# Patient Record
Sex: FEMALE | Race: WHITE | HISPANIC OR LATINO | URBAN - METROPOLITAN AREA
[De-identification: names, ages, dates, MRNs, and addresses within clinical notes are randomized per-mention and may not be internally consistent; named-entity substitution may affect disease eponyms.]

---

## 2021-03-21 ENCOUNTER — APPOINTMENT (OUTPATIENT)
Dept: RADIOLOGY | Facility: IMAGING CENTER | Age: 21
End: 2021-03-21
Attending: PHYSICIAN ASSISTANT
Payer: COMMERCIAL

## 2021-03-21 ENCOUNTER — OFFICE VISIT (OUTPATIENT)
Dept: URGENT CARE | Facility: CLINIC | Age: 21
End: 2021-03-21
Payer: COMMERCIAL

## 2021-03-21 VITALS
TEMPERATURE: 99.3 F | RESPIRATION RATE: 16 BRPM | HEIGHT: 65 IN | WEIGHT: 148.8 LBS | HEART RATE: 92 BPM | BODY MASS INDEX: 24.79 KG/M2 | SYSTOLIC BLOOD PRESSURE: 110 MMHG | DIASTOLIC BLOOD PRESSURE: 60 MMHG | OXYGEN SATURATION: 99 %

## 2021-03-21 DIAGNOSIS — S50.02XA CONTUSION OF LEFT ELBOW, INITIAL ENCOUNTER: ICD-10-CM

## 2021-03-21 DIAGNOSIS — M25.522 LEFT ELBOW PAIN: ICD-10-CM

## 2021-03-21 PROBLEM — K37 APPENDICITIS: Status: ACTIVE | Noted: 2017-05-27

## 2021-03-21 PROCEDURE — 73080 X-RAY EXAM OF ELBOW: CPT | Mod: TC,LT | Performed by: PHYSICIAN ASSISTANT

## 2021-03-21 PROCEDURE — 99203 OFFICE O/P NEW LOW 30 MIN: CPT | Performed by: PHYSICIAN ASSISTANT

## 2021-03-21 RX ORDER — MONTELUKAST SODIUM 10 MG/1
10 TABLET ORAL
COMMUNITY
Start: 2020-12-22 | End: 2023-06-23

## 2021-03-21 RX ORDER — LEVONORGESTREL AND ETHINYL ESTRADIOL 150-30(84)
1 KIT ORAL DAILY
COMMUNITY
Start: 2021-03-07

## 2021-03-21 ASSESSMENT — ENCOUNTER SYMPTOMS
VOMITING: 0
FEVER: 0
CHILLS: 0
NAUSEA: 0
FALLS: 1

## 2021-03-21 NOTE — LETTER
March 21, 2021    To Whom It May Concern:         This is confirmation that Sima Diallo attended her scheduled appointment with Diaz Figueroa P.A.-C. on 3/21/21. Please excuse her from work 3/22-3/24.         If you have any questions please do not hesitate to call me at the phone number listed below.    Sincerely,          Diaz Figueroa P.A.-C.  [phone]

## 2021-03-21 NOTE — PROGRESS NOTES
"Subjective:   Sima Diallo is a 20 y.o. female who presents for Elbow Injury (x3 days, fell while swing dancing, hit left elbow, swollen, bruised, painful. can't bear weight on it.)        Patient is a pleasant 20-year-old female who presents with concerns of elbow, pain, bruising for the last 2 days.  Symptoms began after she fell while swing dancing.  She directly impacted elbow on the ground.  Since then she has had slightly reduced range of motion, pain with ranging, pain with lifting and using the arm.  Rare incidence of tingling in the tips of her fourth and fifth fingers.  She is not currently experiencing this.  She is taking Advil for her symptoms.  This does help, but symptoms return as soon as medication wears off.  Denies prior injury to the elbow.  She is right-hand dominant.    Review of Systems   Constitutional: Negative for chills and fever.   Gastrointestinal: Negative for nausea and vomiting.   Musculoskeletal: Positive for falls and joint pain.       PMH:  has no past medical history on file.  MEDS:   Current Outpatient Medications:   •  montelukast (SINGULAIR) 10 MG Tab, Take 10 mg by mouth every day., Disp: , Rfl:   •  DAYSEE 0.15-0.03 &0.01 MG Tab, Take 1 tablet by mouth every day. TAKE 1 TABLET BY MOUTH DAILY, Disp: , Rfl:   ALLERGIES: Not on File  SURGHX: No past surgical history on file.  SOCHX:  reports that she has never smoked. She has never used smokeless tobacco. She reports current alcohol use. She reports previous drug use.  FH: Family history was reviewed, no pertinent findings to report   Objective:   /60 (BP Location: Left arm, Patient Position: Sitting, BP Cuff Size: Adult)   Pulse 92   Temp 37.4 °C (99.3 °F) (Temporal)   Resp 16   Ht 1.651 m (5' 5\")   Wt 67.5 kg (148 lb 12.8 oz)   SpO2 99%   BMI 24.76 kg/m²   Physical Exam  Vitals reviewed.   Constitutional:       General: She is not in acute distress.     Appearance: Normal appearance. She is well-developed. She is " not toxic-appearing.   HENT:      Head: Normocephalic and atraumatic.      Right Ear: External ear normal.      Left Ear: External ear normal.      Nose: Nose normal.   Eyes:      General: Gaze aligned appropriately.   Cardiovascular:      Rate and Rhythm: Normal rate and regular rhythm.   Pulmonary:      Effort: Pulmonary effort is normal. No respiratory distress.      Breath sounds: No stridor.   Musculoskeletal:      Cervical back: Neck supple.      Comments: Left elbow:  Appearance: Patient has moderate edema and ecchymosis over the posterior aspect of the elbow joint.  Range of motion: Extension to 5 degrees.  Flexion to 110 degrees.  Pronation and supination within normal limits.  Palpation: Very tender to palpation over olecranon.  No tenderness with palpation of the humeral shaft, medial and lateral epicondyles, radial head, radius and ulna shafts.  Neurovascular: Radial pulse +2.  Radial, median, ulnar nerves intact.   Skin:     General: Skin is warm and dry.      Capillary Refill: Capillary refill takes less than 2 seconds.   Neurological:      Mental Status: She is alert and oriented to person, place, and time.      Comments: CN2-12 grossly intact   Psychiatric:         Speech: Speech normal.         Behavior: Behavior normal.     Imaging:   XR: No fracture or dislocation by my read.   Radiology review:    FINDINGS:  Bone mineralization is normal.  There is no evidence of fracture or dislocation.  Soft tissues are normal.     IMPRESSION:     No evidence of fracture or dislocation.      Assessment/Plan:   1. Contusion of left elbow, initial encounter    2. Left elbow pain  - DX-ELBOW-COMPLETE 3+ LEFT; Future    Other orders  - montelukast (SINGULAIR) 10 MG Tab; Take 10 mg by mouth every day.  - DAYSEE 0.15-0.03 &0.01 MG Tab; Take 1 tablet by mouth every day. TAKE 1 TABLET BY MOUTH DAILY    Patient given work note to excuse her from her more physically demanding job.  Slowly increase activity as tolerated.   Avoid activities that could result in reinjury.  Ibuprofen as needed for pain, elevate, ice.  If symptoms fail to improve or fully resolve I would like patient to return to clinic or see PCP for reevaluation.    Differential diagnosis, natural history, supportive care, and indications for immediate follow-up discussed.

## 2023-01-04 ENCOUNTER — HOSPITAL ENCOUNTER (OUTPATIENT)
Facility: MEDICAL CENTER | Age: 23
End: 2023-01-04
Attending: PREVENTIVE MEDICINE
Payer: COMMERCIAL

## 2023-01-04 ENCOUNTER — EMPLOYEE HEALTH (OUTPATIENT)
Dept: OCCUPATIONAL MEDICINE | Facility: CLINIC | Age: 23
End: 2023-01-04
Payer: COMMERCIAL

## 2023-01-04 ENCOUNTER — EH NON-PROVIDER (OUTPATIENT)
Dept: OCCUPATIONAL MEDICINE | Facility: CLINIC | Age: 23
End: 2023-01-04
Payer: COMMERCIAL

## 2023-01-04 DIAGNOSIS — Z02.1 PRE-EMPLOYMENT HEALTH SCREENING EXAMINATION: ICD-10-CM

## 2023-01-04 DIAGNOSIS — Z02.1 PRE-EMPLOYMENT DRUG SCREENING: ICD-10-CM

## 2023-01-04 DIAGNOSIS — Z02.1 PRE-EMPLOYMENT DRUG SCREENING: Primary | ICD-10-CM

## 2023-01-04 LAB
AMP AMPHETAMINE: NORMAL
BAR BARBITURATES: NORMAL
BZO BENZODIAZEPINES: NORMAL
COC COCAINE: NORMAL
INT CON NEG: NORMAL
INT CON POS: NORMAL
MDMA ECSTASY: NORMAL
MET METHAMPHETAMINES: NORMAL
MTD METHADONE: NORMAL
OPI OPIATES: NORMAL
OXY OXYCODONE: NORMAL
PCP PHENCYCLIDINE: NORMAL
POC URINE DRUG SCREEN OCDRS: NORMAL
THC: NORMAL

## 2023-01-04 PROCEDURE — 80305 DRUG TEST PRSMV DIR OPT OBS: CPT | Performed by: PREVENTIVE MEDICINE

## 2023-01-04 PROCEDURE — 86480 TB TEST CELL IMMUN MEASURE: CPT | Performed by: PREVENTIVE MEDICINE

## 2023-01-04 PROCEDURE — 94375 RESPIRATORY FLOW VOLUME LOOP: CPT | Performed by: PREVENTIVE MEDICINE

## 2023-01-04 PROCEDURE — 8915 PR COMPREHENSIVE PHYSICAL: Performed by: NURSE PRACTITIONER

## 2023-01-05 LAB
GAMMA INTERFERON BACKGROUND BLD IA-ACNC: 0.03 IU/ML
M TB IFN-G BLD-IMP: NEGATIVE
M TB IFN-G CD4+ BCKGRND COR BLD-ACNC: -0.01 IU/ML
MITOGEN IGNF BCKGRD COR BLD-ACNC: >10 IU/ML
QFT TB2 - NIL TBQ2: -0.01 IU/ML

## 2023-01-12 ENCOUNTER — EH NON-PROVIDER (OUTPATIENT)
Dept: OCCUPATIONAL MEDICINE | Facility: CLINIC | Age: 23
End: 2023-01-12
Payer: COMMERCIAL

## 2023-01-12 DIAGNOSIS — Z02.83 ENCOUNTER FOR DRUG SCREENING: ICD-10-CM

## 2023-01-12 PROCEDURE — 8911 PR MRO FEE: Performed by: NURSE PRACTITIONER

## 2023-01-18 ENCOUNTER — EH NON-PROVIDER (OUTPATIENT)
Dept: OCCUPATIONAL MEDICINE | Facility: CLINIC | Age: 23
End: 2023-01-18
Payer: COMMERCIAL

## 2023-03-01 ENCOUNTER — OFFICE VISIT (OUTPATIENT)
Dept: URGENT CARE | Facility: CLINIC | Age: 23
End: 2023-03-01
Payer: COMMERCIAL

## 2023-03-01 ENCOUNTER — HOSPITAL ENCOUNTER (OUTPATIENT)
Facility: MEDICAL CENTER | Age: 23
End: 2023-03-01
Attending: NURSE PRACTITIONER
Payer: COMMERCIAL

## 2023-03-01 VITALS
HEIGHT: 64 IN | DIASTOLIC BLOOD PRESSURE: 60 MMHG | HEART RATE: 98 BPM | BODY MASS INDEX: 23.39 KG/M2 | RESPIRATION RATE: 14 BRPM | SYSTOLIC BLOOD PRESSURE: 108 MMHG | WEIGHT: 137 LBS | OXYGEN SATURATION: 99 % | TEMPERATURE: 98.8 F

## 2023-03-01 DIAGNOSIS — R35.0 URINARY FREQUENCY: ICD-10-CM

## 2023-03-01 DIAGNOSIS — R10.9 FLANK PAIN: ICD-10-CM

## 2023-03-01 LAB
APPEARANCE UR: CLEAR
BILIRUB UR STRIP-MCNC: NEGATIVE MG/DL
COLOR UR AUTO: YELLOW
GLUCOSE UR STRIP.AUTO-MCNC: NEGATIVE MG/DL
KETONES UR STRIP.AUTO-MCNC: NEGATIVE MG/DL
LEUKOCYTE ESTERASE UR QL STRIP.AUTO: NEGATIVE
NITRITE UR QL STRIP.AUTO: NEGATIVE
PH UR STRIP.AUTO: 5.5 [PH] (ref 5–8)
POCT INT CON NEG: NEGATIVE
POCT INT CON POS: POSITIVE
POCT URINE PREGNANCY TEST: NEGATIVE
PROT UR QL STRIP: NEGATIVE MG/DL
RBC UR QL AUTO: NORMAL
SP GR UR STRIP.AUTO: 1.01
UROBILINOGEN UR STRIP-MCNC: 0.2 MG/DL

## 2023-03-01 PROCEDURE — 81002 URINALYSIS NONAUTO W/O SCOPE: CPT | Performed by: NURSE PRACTITIONER

## 2023-03-01 PROCEDURE — 99213 OFFICE O/P EST LOW 20 MIN: CPT | Performed by: NURSE PRACTITIONER

## 2023-03-01 PROCEDURE — 87077 CULTURE AEROBIC IDENTIFY: CPT

## 2023-03-01 PROCEDURE — 87086 URINE CULTURE/COLONY COUNT: CPT

## 2023-03-01 PROCEDURE — 81025 URINE PREGNANCY TEST: CPT | Performed by: NURSE PRACTITIONER

## 2023-03-01 PROCEDURE — 87186 SC STD MICRODIL/AGAR DIL: CPT

## 2023-03-01 RX ORDER — IBUPROFEN 600 MG/1
600 TABLET ORAL EVERY 8 HOURS PRN
Qty: 12 TABLET | Refills: 0 | Status: SHIPPED | OUTPATIENT
Start: 2023-03-01 | End: 2023-03-05

## 2023-03-01 RX ORDER — TAMSULOSIN HYDROCHLORIDE 0.4 MG/1
0.4 CAPSULE ORAL
Qty: 30 CAPSULE | Refills: 0 | Status: SHIPPED | OUTPATIENT
Start: 2023-03-01 | End: 2023-03-31

## 2023-03-01 RX ORDER — KETOROLAC TROMETHAMINE 30 MG/ML
30 INJECTION, SOLUTION INTRAMUSCULAR; INTRAVENOUS ONCE
Status: COMPLETED | OUTPATIENT
Start: 2023-03-01 | End: 2023-03-01

## 2023-03-01 RX ORDER — ONDANSETRON 4 MG/1
4 TABLET, ORALLY DISINTEGRATING ORAL EVERY 6 HOURS PRN
Qty: 15 TABLET | Refills: 0 | Status: SHIPPED | OUTPATIENT
Start: 2023-03-01

## 2023-03-01 RX ADMIN — KETOROLAC TROMETHAMINE 30 MG: 30 INJECTION, SOLUTION INTRAMUSCULAR; INTRAVENOUS at 13:05

## 2023-03-01 NOTE — PROGRESS NOTES
Patient has consented to treatment and for use of patient information for treatment and billing purposes.    Date: 03/01/23     Arrival Mode: Private Vehicle    Chief Complaint:    Chief Complaint   Patient presents with    Emesis     Monday, sharp back pain on RT side, history of kidney stones, vomiting, nausea.        History of Present Illness: 22 y.o.  female presents to clinic with 1 day 3 of intermittent right-sided flank pain that is sharp in nature and colic like.  Patient states the pain was severe on Monday night which was 3 nights ago.  She states when the pain does return she has some nausea and vomiting.  Patient does admit to urinary frequency denies urgency or burning with urination.  Patient has had no recent fevers and or body aches.  She is able to keep food down as well as fluid.  Patient states she has had a kidney stone prior that she was able to pass on her own confirmed with a CT scan.  She denies any shortness of breath chest pain or leg swelling.  Denies possibility of STI STD or pregnancy.      ROS:    As stated in HPI     Pertinent Medical History:  History reviewed. No pertinent past medical history.     Pertinent Surgical History:  History reviewed. No pertinent surgical history.     Pertinent Medications:    Current Outpatient Medications on File Prior to Visit   Medication Sig Dispense Refill    DAYSEE 0.15-0.03 &0.01 MG Tab Take 1 tablet by mouth every day. TAKE 1 TABLET BY MOUTH DAILY      montelukast (SINGULAIR) 10 MG Tab Take 10 mg by mouth every day.       No current facility-administered medications on file prior to visit.        Allergies:    Patient has no known allergies.     Social History:  Social History     Tobacco Use    Smoking status: Never    Smokeless tobacco: Never   Vaping Use    Vaping Use: Never used   Substance Use Topics    Alcohol use: Yes    Drug use: Not Currently        Patient's last menstrual period was 02/19/2023 (approximate).           Physical  Exam:    Vitals:    03/01/23 1238   BP: 108/60   Pulse: 98   Resp: 14   Temp: 37.1 °C (98.8 °F)   SpO2: 99%             Physical Exam  Constitutional:       General: She is not in acute distress.     Appearance: Normal appearance. She is well-developed. She is not ill-appearing or toxic-appearing.   HENT:      Head: Normocephalic and atraumatic.   Cardiovascular:      Rate and Rhythm: Normal rate and regular rhythm.      Heart sounds: Normal heart sounds.   Pulmonary:      Effort: Pulmonary effort is normal. No respiratory distress.      Breath sounds: Normal breath sounds. No wheezing.   Abdominal:      General: Abdomen is flat. Bowel sounds are normal.      Palpations: Abdomen is soft.      Tenderness: There is no abdominal tenderness. There is right CVA tenderness. There is no left CVA tenderness, guarding or rebound.   Skin:     General: Skin is warm.      Capillary Refill: Capillary refill takes less than 2 seconds.      Coloration: Skin is not cyanotic or pale.   Neurological:      Mental Status: She is alert and oriented to person, place, and time.      Gait: Gait is intact.   Psychiatric:         Behavior: Behavior normal. Behavior is cooperative.        Diagnostics:    Recent Results (from the past 24 hour(s))   POCT Urinalysis    Collection Time: 03/01/23 12:52 PM   Result Value Ref Range    POC Color Yellow Negative    POC Appearance Clear Negative    POC Glucose Negative Negative mg/dL    POC Bilirubin Negative Negative mg/dL    POC Ketones Negative Negative mg/dL    POC Specific Gravity 1.010 <1.005 - >1.030    POC Blood Trace-intact Negative    POC Urine PH 5.5 5.0 - 8.0    POC Protein Negative Negative mg/dL    POC Urobiligen 0.2 Negative (0.2) mg/dL    POC Nitrites Negative Negative    POC Leukocyte Esterase Negative Negative   POCT PREGNANCY    Collection Time: 03/01/23 12:52 PM   Result Value Ref Range    POC Urine Pregnancy Test Negative     Internal Control Positive Positive     Internal Control  Negative Negative           Urine culture pending    Medical Decision making and clinic course :  I personally reviewed prior external notes and test results pertinent to today's visit. Pt is clinically stable at today's acute urgent care visit.  No acute distress noted. Appropriate for outpatient care at this time. Shared decision-making was utilized with patient for treatment plan.    Pleasant nontoxic-appearing 22-year-old female presenting to clinic with HPI and exam findings consistent with right nephrolithiasis.  Discussed with patient obtaining a CT scan for renal colic using shared decision making patient would like to defer CT scan at this time.  Patient was given in clinic Toradol after approximately 15 minutes patient did have some relief in pain.  Will send for Flomax advised patient to take at night as it can decrease her blood pressure.  Did also send for Zofran for nausea.  Did discuss strict go to ER guidelines and the need for further imaging.  Patient did verbalize understanding and agree with plan.    The patient remained stable during the urgent care visit.    Plan:    Medication discussed included indication for use and the potential  benefits and side effects.    Administrations This Visit       ketorolac (TORADOL) injection 30 mg       Admin Date  03/01/2023 Action  Given Dose  30 mg Route  Intramuscular Administered By  [facility]                     1. Flank pain    - POCT Urinalysis  - ketorolac (TORADOL) injection 30 mg  - tamsulosin (FLOMAX) 0.4 MG capsule; Take 1 Capsule by mouth 1/2 hour after breakfast for 30 days.  Dispense: 30 Capsule; Refill: 0  - ondansetron (ZOFRAN ODT) 4 MG TABLET DISPERSIBLE; Take 1 Tablet by mouth every 6 hours as needed for Nausea/Vomiting for up to 15 doses.  Dispense: 15 Tablet; Refill: 0  - ibuprofen (MOTRIN) 600 MG Tab; Take 1 Tablet by mouth every 8 hours as needed for Moderate Pain for up to 4 days.  Dispense: 12 Tablet; Refill: 0    2.  Urinary frequency    - URINE CULTURE(NEW); Future  - POCT PREGNANCY      All of the patient's questions were answered to their satisfaction at the time of discharge.    Follow up:    Recommended f/u in  24 hours  if there is no improvement.    Patient was encouraged to monitor symptoms closely. Those signs and symptoms which would warrant concern and mandate seeking a higher level of service through the emergency department discussed at length and included in discharge papers.  Patient stated agreement and understanding of this plan of care.    Disposition:  Home in stable condition       Voice Recognition Disclaimer:  Portions of this document were created using voice recognition software. The software does have a chance of producing errors of grammar and possibly content. I have made every reasonable attempt to correct obvious errors, but there may be errors of grammar and possibly content that I did not discover before finalizing the documentation.    Janine Zhang, [email or web address].

## 2023-03-04 ENCOUNTER — TELEPHONE (OUTPATIENT)
Dept: URGENT CARE | Facility: PHYSICIAN GROUP | Age: 23
End: 2023-03-04
Payer: COMMERCIAL

## 2023-03-04 DIAGNOSIS — N30.00 ACUTE CYSTITIS WITHOUT HEMATURIA: ICD-10-CM

## 2023-03-04 LAB
BACTERIA UR CULT: ABNORMAL
BACTERIA UR CULT: ABNORMAL
SIGNIFICANT IND 70042: ABNORMAL
SITE SITE: ABNORMAL
SOURCE SOURCE: ABNORMAL

## 2023-03-04 RX ORDER — CEFDINIR 300 MG/1
300 CAPSULE ORAL 2 TIMES DAILY
Qty: 20 CAPSULE | Refills: 0 | Status: SHIPPED | OUTPATIENT
Start: 2023-03-04 | End: 2023-03-14

## 2023-03-04 NOTE — TELEPHONE ENCOUNTER
Called and discussed results with pt. Due to previous flank pain will tx for 10 days.     1. Acute cystitis without hematuria    - cefdinir (OMNICEF) 300 MG Cap; Take 1 Capsule by mouth 2 times a day for 10 days.  Dispense: 20 Capsule; Refill: 0

## 2023-06-23 ENCOUNTER — OFFICE VISIT (OUTPATIENT)
Dept: URGENT CARE | Facility: CLINIC | Age: 23
End: 2023-06-23
Payer: COMMERCIAL

## 2023-06-23 VITALS
WEIGHT: 135 LBS | DIASTOLIC BLOOD PRESSURE: 60 MMHG | BODY MASS INDEX: 23.05 KG/M2 | HEART RATE: 88 BPM | OXYGEN SATURATION: 98 % | SYSTOLIC BLOOD PRESSURE: 114 MMHG | RESPIRATION RATE: 16 BRPM | HEIGHT: 64 IN | TEMPERATURE: 98.2 F

## 2023-06-23 DIAGNOSIS — J01.40 ACUTE NON-RECURRENT PANSINUSITIS: ICD-10-CM

## 2023-06-23 PROCEDURE — 3078F DIAST BP <80 MM HG: CPT | Performed by: REGISTERED NURSE

## 2023-06-23 PROCEDURE — 3074F SYST BP LT 130 MM HG: CPT | Performed by: REGISTERED NURSE

## 2023-06-23 PROCEDURE — 99213 OFFICE O/P EST LOW 20 MIN: CPT | Performed by: REGISTERED NURSE

## 2023-06-23 RX ORDER — AMOXICILLIN AND CLAVULANATE POTASSIUM 875; 125 MG/1; MG/1
1 TABLET, FILM COATED ORAL 2 TIMES DAILY
Qty: 14 TABLET | Refills: 0 | Status: SHIPPED | OUTPATIENT
Start: 2023-06-23 | End: 2023-06-30

## 2023-06-23 ASSESSMENT — ENCOUNTER SYMPTOMS
DIZZINESS: 0
NECK PAIN: 0
CHILLS: 1
FEVER: 0
SINUS PAIN: 1
HEADACHES: 0
SHORTNESS OF BREATH: 0

## 2023-06-23 NOTE — PROGRESS NOTES
"Subjective:   Sima Diallo is a 22 y.o. female who presents for Sore Throat (X 1 day, ear pain, sore throat, following a cold. )      HPI  2-3 weeks of nasal congestion, nasal drainage that is thick and yellow, sore throat, cough that has since improved, occasional sore throat. Symptoms started to improve and then she had a resurgence of sinus focused symptoms. No ear or sinus surgeries. No recent antibiotic. No underlying medical issues, immunizations up to date. Denies pregnancy.     Review of Systems   Constitutional:  Positive for chills. Negative for fever.   HENT:  Positive for ear pain and sinus pain.    Respiratory:  Negative for shortness of breath.    Cardiovascular:  Negative for chest pain.   Musculoskeletal:  Negative for neck pain.   Skin:  Negative for rash.   Neurological:  Negative for dizziness and headaches.       Medications, Allergies, and current problem list reviewed today in Epic.     Objective:     /60   Pulse 88   Temp 36.8 °C (98.2 °F) (Temporal)   Resp 16   Ht 1.626 m (5' 4\")   Wt 61.2 kg (135 lb)   SpO2 98%     Physical Exam  Vitals and nursing note reviewed.   Constitutional:       General: She is not in acute distress.     Appearance: Normal appearance. She is well-developed. She is not ill-appearing, toxic-appearing or diaphoretic.   HENT:      Head: Normocephalic and atraumatic.      Right Ear: Hearing, tympanic membrane, ear canal and external ear normal. No decreased hearing noted. Tympanic membrane is not erythematous.      Left Ear: Hearing, tympanic membrane, ear canal and external ear normal. No decreased hearing noted. Tympanic membrane is not erythematous.      Nose: Mucosal edema, congestion and rhinorrhea present. Rhinorrhea is purulent.      Right Turbinates: Swollen.      Left Turbinates: Swollen.      Right Sinus: Maxillary sinus tenderness present.      Left Sinus: Maxillary sinus tenderness present.      Mouth/Throat:      Mouth: Mucous membranes are " moist.      Dentition: Normal dentition.      Pharynx: Posterior oropharyngeal erythema present. No oropharyngeal exudate.   Eyes:      General: No scleral icterus.        Right eye: No discharge.         Left eye: No discharge.      Conjunctiva/sclera: Conjunctivae normal.   Cardiovascular:      Rate and Rhythm: Normal rate and regular rhythm.      Pulses: Normal pulses.      Heart sounds: Normal heart sounds. No murmur heard.  Pulmonary:      Effort: Pulmonary effort is normal. No respiratory distress.      Breath sounds: Normal breath sounds. No wheezing, rhonchi or rales.   Musculoskeletal:      Cervical back: Normal range of motion and neck supple.   Lymphadenopathy:      Cervical: No cervical adenopathy.   Skin:     General: Skin is warm and dry.      Nails: There is no clubbing.   Neurological:      General: No focal deficit present.      Mental Status: She is alert and oriented to person, place, and time. Mental status is at baseline.   Psychiatric:         Mood and Affect: Mood normal.       Assessment/Plan:     Diagnosis and associated orders:     1. Acute non-recurrent pansinusitis  amoxicillin-clavulanate (AUGMENTIN) 875-125 MG Tab           Comments/MDM:     Vital signs WNL, non toxic appearance, no red flag signs or symptoms  2-3 weeks of symptoms, there was a period of improvement followed by resurgence of sinus focus symptoms  Frontal and maxillary sinus tenderness, swollen turbinates, posterior OP erythemic, thick yellow drainage  Will start on antibiotic for bacterial sinusitis  Continue with OTC cold and sinus medications, sinus rinses, warm steam showers, adequate hydration  Follow up with primary care provider          Differential diagnosis, natural history, supportive care, and indications for immediate follow-up discussed.    Return to clinic or go to ED if symptoms worsen or persist. Indications for ED discussed at length. Patient/Parent/Guardian voices understanding. Follow-up with your  primary care provider in 3-5 days. Red flag symptoms discussed. All side effects of medication discussed including allergic response, GI upset, tendon injury, rash, sedation etc.    I personally reviewed prior external notes and test results pertinent to today's visit as well as additional imaging and testing completed in clinic today.     Please note that this dictation was created using voice recognition software. I have made every reasonable attempt to correct obvious errors, but I expect that there are errors of grammar and possibly content that I did not discover before finalizing the note.    This note was electronically signed by OSCAR Flores

## 2023-06-28 ENCOUNTER — OFFICE VISIT (OUTPATIENT)
Dept: URGENT CARE | Facility: CLINIC | Age: 23
End: 2023-06-28
Payer: COMMERCIAL

## 2023-06-28 VITALS
HEIGHT: 64 IN | BODY MASS INDEX: 23.39 KG/M2 | DIASTOLIC BLOOD PRESSURE: 70 MMHG | OXYGEN SATURATION: 99 % | WEIGHT: 137 LBS | TEMPERATURE: 98.2 F | HEART RATE: 94 BPM | RESPIRATION RATE: 16 BRPM | SYSTOLIC BLOOD PRESSURE: 120 MMHG

## 2023-06-28 DIAGNOSIS — J01.00 ACUTE MAXILLARY SINUSITIS, RECURRENCE NOT SPECIFIED: ICD-10-CM

## 2023-06-28 DIAGNOSIS — H65.91 MIDDLE EAR EFFUSION, RIGHT: ICD-10-CM

## 2023-06-28 PROCEDURE — 3078F DIAST BP <80 MM HG: CPT | Performed by: PHYSICIAN ASSISTANT

## 2023-06-28 PROCEDURE — 3074F SYST BP LT 130 MM HG: CPT | Performed by: PHYSICIAN ASSISTANT

## 2023-06-28 PROCEDURE — 99213 OFFICE O/P EST LOW 20 MIN: CPT | Performed by: PHYSICIAN ASSISTANT

## 2023-06-28 RX ORDER — PREDNISONE 10 MG/1
TABLET ORAL
Qty: 15 TABLET | Refills: 0 | Status: SHIPPED | OUTPATIENT
Start: 2023-06-28

## 2023-06-28 ASSESSMENT — ENCOUNTER SYMPTOMS
FEVER: 0
FOCAL WEAKNESS: 0
SHORTNESS OF BREATH: 0
HEADACHES: 1
VOMITING: 0
SPEECH CHANGE: 0
SENSORY CHANGE: 0
SORE THROAT: 0
COUGH: 0
NAUSEA: 1
STRIDOR: 0
DIZZINESS: 0
WHEEZING: 0
DIARRHEA: 0
CHILLS: 0
ABDOMINAL PAIN: 0
WEAKNESS: 0
SINUS PAIN: 1
TINGLING: 0

## 2023-06-28 NOTE — PROGRESS NOTES
"Subjective     Sima Diallo is a 22 y.o. female who presents with Sinusitis (Was prescribed antibiotics and is on 5 days, still having facial pressure and right ear feels clogged and pressure. Also feeling nausea from the pain.)            Patient was seen 6 days ago and is on day 5 of AUgmentin for a sinus infection. She reports her drainage and overall feeling of wellness has improved but she continues to have intense right facial pressure, right ear fullness and decreased hearing. She denies fever or chills. She has moderate sinus pressure. She states she is nauseous because of the pressure. No respiratory symptoms.      No past medical history on file.      No past surgical history on file.      No family history on file.    Allergies   Allergen Reactions    Cefdinir Rash     Rash.       Cefdinir      Medications, Allergies, and current problem list reviewed today in Epic    Review of Systems   Constitutional:  Negative for chills, fever and malaise/fatigue.   HENT:  Positive for congestion, ear pain, hearing loss and sinus pain. Negative for sore throat.    Respiratory:  Negative for cough, shortness of breath, wheezing and stridor.    Cardiovascular:  Negative for chest pain and leg swelling.   Gastrointestinal:  Positive for nausea. Negative for abdominal pain, diarrhea and vomiting.   Neurological:  Positive for headaches. Negative for dizziness, tingling, sensory change, speech change, focal weakness and weakness.        All other systems reviewed and are negative. '        Objective     /70 (BP Location: Left arm, Patient Position: Sitting, BP Cuff Size: Adult)   Pulse 94   Temp 36.8 °C (98.2 °F) (Temporal)   Resp 16   Ht 1.626 m (5' 4\")   Wt 62.1 kg (137 lb)   SpO2 99%   BMI 23.52 kg/m²      Physical Exam  Constitutional:       General: She is not in acute distress.     Appearance: She is not ill-appearing.   HENT:      Head: Normocephalic and atraumatic.      Right Ear: Ear canal and " external ear normal. A middle ear effusion is present. Tympanic membrane is injected.      Left Ear: Tympanic membrane, ear canal and external ear normal.      Nose: Congestion and rhinorrhea present.      Right Sinus: Maxillary sinus tenderness and frontal sinus tenderness present.      Left Sinus: No maxillary sinus tenderness or frontal sinus tenderness.      Mouth/Throat:      Mouth: Mucous membranes are moist.      Pharynx: No posterior oropharyngeal erythema.   Eyes:      Conjunctiva/sclera: Conjunctivae normal.   Cardiovascular:      Rate and Rhythm: Normal rate and regular rhythm.      Heart sounds: Normal heart sounds.   Pulmonary:      Effort: Pulmonary effort is normal. No respiratory distress.      Breath sounds: Normal breath sounds. No wheezing, rhonchi or rales.   Skin:     General: Skin is warm and dry.   Neurological:      General: No focal deficit present.      Mental Status: She is alert and oriented to person, place, and time.   Psychiatric:         Mood and Affect: Mood normal.         Behavior: Behavior normal.         Thought Content: Thought content normal.         Judgment: Judgment normal.                             Assessment & Plan        1. Acute maxillary sinusitis, recurrence not specified    2. Middle ear effusion, right    - predniSONE (DELTASONE) 10 MG Tab; 3 tabs po daily 5 days.  Dispense: 15 Tablet; Refill: 0  Finish antibiotics  Continue Saline rinses and Sudafed.    Differential diagnoses, Supportive care, and indications for immediate follow-up discussed with patient.   Pathogenesis of diagnosis discussed including typical length and natural progression.   Instructed to return to clinic or nearest emergency department for any change in condition, further concerns, or worsening of symptoms.        The patient demonstrated a good understanding and agreed with the treatment plan.      Janine Moses P.A.-C.

## 2023-10-02 ENCOUNTER — IMMUNIZATION (OUTPATIENT)
Dept: OCCUPATIONAL MEDICINE | Facility: CLINIC | Age: 23
End: 2023-10-02

## 2023-10-02 DIAGNOSIS — Z23 NEED FOR VACCINATION: Primary | ICD-10-CM

## 2023-10-02 PROCEDURE — 90686 IIV4 VACC NO PRSV 0.5 ML IM: CPT | Performed by: PREVENTIVE MEDICINE

## 2024-04-02 ENCOUNTER — OFFICE VISIT (OUTPATIENT)
Dept: URGENT CARE | Facility: PHYSICIAN GROUP | Age: 24
End: 2024-04-02
Payer: COMMERCIAL

## 2024-04-02 VITALS
OXYGEN SATURATION: 99 % | BODY MASS INDEX: 24.75 KG/M2 | DIASTOLIC BLOOD PRESSURE: 60 MMHG | HEART RATE: 108 BPM | WEIGHT: 145 LBS | SYSTOLIC BLOOD PRESSURE: 100 MMHG | RESPIRATION RATE: 18 BRPM | HEIGHT: 64 IN | TEMPERATURE: 98 F

## 2024-04-02 DIAGNOSIS — R10.9 ACUTE RIGHT FLANK PAIN: ICD-10-CM

## 2024-04-02 DIAGNOSIS — R35.0 URINARY FREQUENCY: ICD-10-CM

## 2024-04-02 DIAGNOSIS — N30.01 ACUTE CYSTITIS WITH HEMATURIA: Primary | ICD-10-CM

## 2024-04-02 LAB
APPEARANCE UR: NORMAL
BILIRUB UR STRIP-MCNC: NEGATIVE MG/DL
COLOR UR AUTO: YELLOW
GLUCOSE UR STRIP.AUTO-MCNC: NEGATIVE MG/DL
KETONES UR STRIP.AUTO-MCNC: NEGATIVE MG/DL
LEUKOCYTE ESTERASE UR QL STRIP.AUTO: NORMAL
NITRITE UR QL STRIP.AUTO: NEGATIVE
PH UR STRIP.AUTO: 5.5 [PH] (ref 5–8)
POCT INT CON NEG: NEGATIVE
POCT INT CON POS: POSITIVE
POCT URINE PREGNANCY TEST: NEGATIVE
PROT UR QL STRIP: NEGATIVE MG/DL
RBC UR QL AUTO: NORMAL
SP GR UR STRIP.AUTO: 1.02
UROBILINOGEN UR STRIP-MCNC: 0.2 MG/DL

## 2024-04-02 PROCEDURE — 81025 URINE PREGNANCY TEST: CPT | Performed by: PHYSICIAN ASSISTANT

## 2024-04-02 PROCEDURE — 99213 OFFICE O/P EST LOW 20 MIN: CPT | Performed by: PHYSICIAN ASSISTANT

## 2024-04-02 PROCEDURE — 81002 URINALYSIS NONAUTO W/O SCOPE: CPT | Performed by: PHYSICIAN ASSISTANT

## 2024-04-02 PROCEDURE — 3074F SYST BP LT 130 MM HG: CPT | Performed by: PHYSICIAN ASSISTANT

## 2024-04-02 PROCEDURE — 3078F DIAST BP <80 MM HG: CPT | Performed by: PHYSICIAN ASSISTANT

## 2024-04-02 RX ORDER — SULFAMETHOXAZOLE AND TRIMETHOPRIM 800; 160 MG/1; MG/1
1 TABLET ORAL 2 TIMES DAILY
Qty: 14 TABLET | Refills: 0 | Status: SHIPPED | OUTPATIENT
Start: 2024-04-02 | End: 2024-04-09

## 2024-04-02 ASSESSMENT — ENCOUNTER SYMPTOMS
FLANK PAIN: 1
FEVER: 0
ANOREXIA: 0
VOMITING: 0
CHILLS: 0
MYALGIAS: 1
CHANGE IN BOWEL HABIT: 0

## 2024-04-02 NOTE — PROGRESS NOTES
"Subjective     Sima Diallo is a 23 y.o. female who presents with UTI (Flank pain, frequent urination, )            Patient presents with:  UTI: Flank pain, frequent urination for the past few days.  Pt has had UTI in past with similar symptom onset with neg UA but positive urine culture. 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  Patient denies fever, chills, nausea vomiting or diarrhea.  No other complaints.  Patient has not taken any over-the-counter medications for her symptoms.        UTI  This is a new problem. The current episode started in the past 7 days. The problem occurs constantly. The problem has been gradually worsening. Associated symptoms include myalgias and urinary symptoms. Pertinent negatives include no anorexia, change in bowel habit, chills, fever or vomiting. She has tried drinking and NSAIDs for the symptoms. The treatment provided no relief.       Review of Systems   Constitutional:  Negative for chills and fever.   Gastrointestinal:  Negative for anorexia, change in bowel habit and vomiting.   Genitourinary:  Positive for flank pain (right).   Musculoskeletal:  Positive for myalgias.   All other systems reviewed and are negative.             Objective     /60 (BP Location: Left arm, Patient Position: Sitting, BP Cuff Size: Adult)   Pulse (!) 108   Temp 36.7 °C (98 °F) (Temporal)   Resp 18   Ht 1.626 m (5' 4\")   Wt 65.8 kg (145 lb)   SpO2 99%   BMI 24.89 kg/m²      Physical Exam  Vitals and nursing note reviewed.   Constitutional:       General: She is not in acute distress.     Appearance: Normal appearance. She is well-developed. She is not toxic-appearing.   HENT:      Head: Normocephalic and atraumatic.      Nose: Nose normal.      Mouth/Throat:      Mouth: Mucous membranes are moist.   Eyes:      Extraocular Movements: Extraocular movements intact.      Conjunctiva/sclera: Conjunctivae normal.      Pupils: " Pupils are equal, round, and reactive to light.   Cardiovascular:      Rate and Rhythm: Normal rate and regular rhythm.      Pulses: Normal pulses.      Heart sounds: Normal heart sounds.   Pulmonary:      Effort: Pulmonary effort is normal.      Breath sounds: Normal breath sounds.   Abdominal:      General: Bowel sounds are normal.      Palpations: Abdomen is soft.      Tenderness: There is right CVA tenderness. There is no guarding or rebound.   Musculoskeletal:         General: Normal range of motion.      Cervical back: Normal range of motion and neck supple.   Skin:     General: Skin is warm and dry.      Capillary Refill: Capillary refill takes less than 2 seconds.   Neurological:      General: No focal deficit present.      Mental Status: She is alert and oriented to person, place, and time.      Gait: Gait normal.   Psychiatric:         Mood and Affect: Mood normal.         Behavior: Behavior is cooperative.                             Assessment & Plan        1. Acute cystitis with hematuria     - sulfamethoxazole-trimethoprim (BACTRIM DS) 800-160 MG tablet; Take 1 Tablet by mouth 2 times a day for 7 days.  Dispense: 14 Tablet; Refill: 0  - POCT Urinalysis  - POCT Pregnancy    2. Acute right flank pain     - sulfamethoxazole-trimethoprim (BACTRIM DS) 800-160 MG tablet; Take 1 Tablet by mouth 2 times a day for 7 days.  Dispense: 14 Tablet; Refill: 0  - POCT Urinalysis  - POCT Pregnancy    3. Urinary frequency     - sulfamethoxazole-trimethoprim (BACTRIM DS) 800-160 MG tablet; Take 1 Tablet by mouth 2 times a day for 7 days.  Dispense: 14 Tablet; Refill: 0  - POCT Urinalysis  - POCT Pregnancy          UA: Cloudy, positive leuk esterase, positive blood.  Pregnancy: Negative    Patient HPI, physical exam and positive UA consistent with UTI.  I reviewed patient's most recent urine culture which was pansensitive.  I will treat with Bactrim DS twice daily x 7 days due to patient's flank pain I feel nitrofurantoin  is not the best choice as Bactrim has better sensitivity.    PT can take over the counter NSAIDS (ibuprofen, naproxen) as needed for relief of pain, fever and swelling.  These can be taken for symptoms every (8,12) hours.        Differential diagnosis, supportive care, and indications for immediate follow-up discussed with patient.  Instructed to return to clinic or nearest emergency department for any change in condition, further concerns, or worsening of symptoms.    I personally reviewed prior external notes and test results pertinent to today's visit.  I have independently reviewed and interpreted all diagnostics ordered during this urgent care visit.    PT should follow up with PCP in 1-2 days for re-evaluation if symptoms have not improved.      Discussed red flags and reasons to return to UC or ED.      Pt and/or family verbalized understanding of diagnosis and follow up instructions and was offered informational handout on diagnosis.  PT discharged.     Please note that this dictation was created using voice recognition software. I have made every reasonable attempt to correct obvious errors, but I expect that there may be errors of grammar and possibly content that I did not discover before finalizing the note.

## 2025-02-21 ENCOUNTER — OFFICE VISIT (OUTPATIENT)
Dept: MEDICAL GROUP | Facility: IMAGING CENTER | Age: 25
End: 2025-02-21
Payer: COMMERCIAL

## 2025-02-21 ENCOUNTER — RESULTS FOLLOW-UP (OUTPATIENT)
Dept: MEDICAL GROUP | Facility: IMAGING CENTER | Age: 25
End: 2025-02-21

## 2025-02-21 VITALS
HEART RATE: 102 BPM | SYSTOLIC BLOOD PRESSURE: 104 MMHG | TEMPERATURE: 97.9 F | HEIGHT: 64 IN | BODY MASS INDEX: 26.26 KG/M2 | OXYGEN SATURATION: 98 % | DIASTOLIC BLOOD PRESSURE: 56 MMHG | RESPIRATION RATE: 14 BRPM | WEIGHT: 153.8 LBS

## 2025-02-21 DIAGNOSIS — Z13.21 ENCOUNTER FOR VITAMIN DEFICIENCY SCREENING: ICD-10-CM

## 2025-02-21 DIAGNOSIS — Z00.00 HEALTHCARE MAINTENANCE: ICD-10-CM

## 2025-02-21 DIAGNOSIS — Z11.4 SCREENING FOR HIV (HUMAN IMMUNODEFICIENCY VIRUS): ICD-10-CM

## 2025-02-21 DIAGNOSIS — R42 DIZZINESS: ICD-10-CM

## 2025-02-21 DIAGNOSIS — Z11.59 NEED FOR HEPATITIS C SCREENING TEST: ICD-10-CM

## 2025-02-21 PROBLEM — K37 APPENDICITIS: Status: RESOLVED | Noted: 2017-05-27 | Resolved: 2025-02-21

## 2025-02-21 LAB
GLUCOSE BLD-MCNC: 103 MG/DL (ref 65–99)
HBA1C MFR BLD: 5.1 % (ref ?–5.8)
POCT INT CON NEG: NEGATIVE
POCT INT CON POS: POSITIVE

## 2025-02-21 PROCEDURE — 3074F SYST BP LT 130 MM HG: CPT | Performed by: STUDENT IN AN ORGANIZED HEALTH CARE EDUCATION/TRAINING PROGRAM

## 2025-02-21 PROCEDURE — 99214 OFFICE O/P EST MOD 30 MIN: CPT | Mod: 25 | Performed by: STUDENT IN AN ORGANIZED HEALTH CARE EDUCATION/TRAINING PROGRAM

## 2025-02-21 PROCEDURE — 3078F DIAST BP <80 MM HG: CPT | Performed by: STUDENT IN AN ORGANIZED HEALTH CARE EDUCATION/TRAINING PROGRAM

## 2025-02-21 PROCEDURE — 83036 HEMOGLOBIN GLYCOSYLATED A1C: CPT | Performed by: STUDENT IN AN ORGANIZED HEALTH CARE EDUCATION/TRAINING PROGRAM

## 2025-02-21 PROCEDURE — 82962 GLUCOSE BLOOD TEST: CPT | Performed by: STUDENT IN AN ORGANIZED HEALTH CARE EDUCATION/TRAINING PROGRAM

## 2025-02-21 ASSESSMENT — PATIENT HEALTH QUESTIONNAIRE - PHQ9: CLINICAL INTERPRETATION OF PHQ2 SCORE: 0

## 2025-02-21 NOTE — PATIENT INSTRUCTIONS
Thank you for choosing [facility]. It was a pleasure meeting you today.     Take care!  Tamie NaiduThe [facility]- [facility] [place]

## 2025-02-21 NOTE — PROGRESS NOTES
Subjective:     CC:   Chief Complaint   Patient presents with    Establish Care     [facility]          HPI:     Verbal consent was acquired by the patient to use [employer] ambient listening note generation during this visit Yes      Sima Diallopj, 24 y.o., female,  presents today to discuss:     History of Present Illness    Establish [facility]  She is here today to establish care, having previously been a patient at the [facility]. Her last Pap smear was conducted approximately one year ago, during the summer. She has no history of diabetes mellitus, hypertension, migraines, or hypoglycemia. She has not had any recent laboratory workup. She has a family history of thyroid disease, though she is uncertain whether it was hyperthyroidism or hypothyroidism. Additionally, she has a family history of neurological and cardiac diseases. She has a history of appendectomy performed in either 2016 or 2017. She takes a gummy multivitamin. The patient is currently on oral contraceptive therapy and does not require any refills at this time. She has been on this medication for approximately 5 to 6 years, administered in the morning.      Dizziness  She reports experiencing intermittent episodes of dizziness for the past 6 months, occurring at least every other day. These episodes are frequently accompanied by cephalalgia, nausea, and a sensation of heat and diaphoresis, but no emesis. She denies any visual disturbances or true vertigo but describes transient retro-orbital pain. The episodes typically last approximately 30 minutes, with the initial 15 minutes being more intense. She has attempted to mitigate these symptoms by increasing her water intake, without success. She denies any chest pain, palpitations, or arrhythmias during these episodes. Occasionally, she feels mildly tremulous. She has observed that these episodes often occur postprandially, and delaying breakfast or lunch can result in  "lightheadedness. She has monitored her blood glucose levels during these episodes, which have consistently been within the normal range. Ingesting food does not seem to ameliorate her symptoms. Additionally, she experiences mild frontal cephalalgia, akin to sinus headaches, which are brief in duration. She has noted that her blood pressure tends to be slightly hypotensive in the mornings but normalizes postprandially. She denies any orthostatic dizziness. She has a history of motion sickness since childhood.          SOCIAL HISTORY  - Does not smoke or vape  - Does not use drugs  - Occasionally consumes alcohol when out with friends or at dinner  - Works as a nurse at [employer]    FAMILY HISTORY  - Mother has a history of thyroid disease  - Father had a heart attack last year and has a family history of heart issues, including his father who had a triple bypass  - Brother has multiple sclerosis  - Maternal grandmother had breast cancer  - Maternal grandfather had colon and bladder cancer           ROS:  See HPI    Medications, allergies, past medical history, family history, surgical history, and social history documented in chart and reviewed by me.       Objective:   Exam:  /56 (BP Location: Right arm, Patient Position: Sitting, BP Cuff Size: Adult)   Pulse (!) 102   Temp 36.6 °C (97.9 °F) (Temporal)   Resp 14   Ht 1.626 m (5' 4\")   Wt 69.8 kg (153 lb 12.8 oz)   LMP 02/21/2025   SpO2 98%   BMI 26.40 kg/m²      Physical Exam  Vitals reviewed.   Constitutional:       General: She is not in acute distress.     Appearance: Normal appearance.   HENT:      Head: Normocephalic and atraumatic.      Right Ear: Tympanic membrane, ear canal and external ear normal.      Left Ear: Tympanic membrane, ear canal and external ear normal.      Nose: Nose normal. No congestion.      Mouth/Throat:      Mouth: Mucous membranes are moist.      Pharynx: Oropharynx is clear. No oropharyngeal exudate or " posterior oropharyngeal erythema.   Eyes:      General: No scleral icterus.     Extraocular Movements: Extraocular movements intact.      Conjunctiva/sclera: Conjunctivae normal.      Pupils: Pupils are equal, round, and reactive to light.   Neck:      Thyroid: No thyroid mass or thyromegaly.      Vascular: No carotid bruit.   Cardiovascular:      Rate and Rhythm: Normal rate and regular rhythm.      Pulses: Normal pulses.      Heart sounds: Normal heart sounds. No murmur heard.  Pulmonary:      Effort: Pulmonary effort is normal. No respiratory distress.      Breath sounds: Normal breath sounds. No wheezing.   Abdominal:      General: Bowel sounds are normal. There is no distension.      Palpations: Abdomen is soft. There is no mass.      Tenderness: There is no abdominal tenderness. There is no guarding.      Hernia: No hernia is present.   Musculoskeletal:         General: No swelling, tenderness or deformity. Normal range of motion.      Cervical back: Normal range of motion and neck supple.      Right lower leg: No edema.      Left lower leg: No edema.   Lymphadenopathy:      Cervical: No cervical adenopathy.   Skin:     General: Skin is warm and dry.      Coloration: Skin is not jaundiced.      Findings: No bruising, erythema, lesion or rash.   Neurological:      General: No focal deficit present.      Mental Status: She is alert and oriented to person, place, and time.      Cranial Nerves: No cranial nerve deficit.      Sensory: No sensory deficit.      Motor: No weakness.      Coordination: Coordination normal.      Gait: Gait normal.   Psychiatric:         Mood and Affect: Mood normal.         Behavior: Behavior normal.         Thought Content: Thought content normal.         Judgment: Judgment normal.              Assessment & Plan:       Assessment & Plan    1. Dizziness  New diagnosis.  Stable.  For the past 6 months patient has been experiencing intermittent episodes of dizziness without precipitating  events or triggers.  The etiology of the dizziness could be multifactorial.  Vital signs remarkable for slightly elevated heart rate otherwise normal.  Physical exam is normal.  EKG was conducted today and it is sinus rhythm.  A1c and glucose were also conducted and they are normal.  A complete blood count (CBC), metabolic panel, thyroid panel, lipid panel,  iron panel, B12, and folic acid tests were ordered. She has been counseled to maintain adequate hydration and consume balanced meals. She has been instructed to monitor her symptoms closely and report any new developments. If the blood work results are within normal limits, an MRI of the brain will be considered.  - TSH WITH REFLEX TO FT4; Future  - Comp Metabolic Panel; Future  - CBC WITH DIFFERENTIAL; Future  - Lipid Profile; Future  - IRON/TOTAL IRON BIND; Future  - FERRITIN; Future  - VIT B12,  FOLIC ACID  - POCT Glucose  - POCT  A1C  - EKG - Clinic Performed   Latest Reference Range & Units 02/21/25 10:00   Glycohemoglobin <=5.8 % 5.1      Latest Reference Range & Units 02/21/25 09:59   Glucose - Accu-Ck 65 - 99 mg/dL 103 !   !: Data is abnormal    2. Need for hepatitis C screening test  - HEP C VIRUS ANTIBODY; Future    3. Screening for HIV (human immunodeficiency virus)  - HIV AG/AB COMBO ASSAY SCREENING; Future    4. Encounter for vitamin deficiency screening  - VITAMIN D 25-HYDROXY    5. Healthcare maintenance  Records from [facility] will be requested to update her file. She has been advised to continue her birth control medication and multivitamin supplements.           Pt agreed with treatment plan and verbalized understanding.     Return for f/u after completing tests.     Please note that this dictation was created using voice recognition software. I have made every reasonable attempt to correct obvious errors, but I expect that there are errors of grammar and possibly content that I did not discover before finalizing the  note.    Tamie Joyner PA-C  [facility]

## 2025-02-22 ENCOUNTER — HOSPITAL ENCOUNTER (OUTPATIENT)
Dept: LAB | Facility: MEDICAL CENTER | Age: 25
End: 2025-02-22
Attending: STUDENT IN AN ORGANIZED HEALTH CARE EDUCATION/TRAINING PROGRAM
Payer: COMMERCIAL

## 2025-02-22 DIAGNOSIS — Z11.59 NEED FOR HEPATITIS C SCREENING TEST: ICD-10-CM

## 2025-02-22 DIAGNOSIS — Z11.4 SCREENING FOR HIV (HUMAN IMMUNODEFICIENCY VIRUS): ICD-10-CM

## 2025-02-22 DIAGNOSIS — R42 DIZZINESS: ICD-10-CM

## 2025-02-22 LAB
ALBUMIN SERPL BCP-MCNC: 4.2 G/DL (ref 3.2–4.9)
ALBUMIN/GLOB SERPL: 1.5 G/DL
ALP SERPL-CCNC: 73 U/L (ref 30–99)
ALT SERPL-CCNC: 26 U/L (ref 2–50)
ANION GAP SERPL CALC-SCNC: 11 MMOL/L (ref 7–16)
AST SERPL-CCNC: 20 U/L (ref 12–45)
BASOPHILS # BLD AUTO: 1.1 % (ref 0–1.8)
BASOPHILS # BLD: 0.04 K/UL (ref 0–0.12)
BILIRUB SERPL-MCNC: 0.5 MG/DL (ref 0.1–1.5)
BUN SERPL-MCNC: 13 MG/DL (ref 8–22)
CALCIUM ALBUM COR SERPL-MCNC: 9.2 MG/DL (ref 8.5–10.5)
CALCIUM SERPL-MCNC: 9.4 MG/DL (ref 8.5–10.5)
CHLORIDE SERPL-SCNC: 104 MMOL/L (ref 96–112)
CHOLEST SERPL-MCNC: 156 MG/DL (ref 100–199)
CO2 SERPL-SCNC: 25 MMOL/L (ref 20–33)
CREAT SERPL-MCNC: 0.74 MG/DL (ref 0.5–1.4)
EOSINOPHIL # BLD AUTO: 0.14 K/UL (ref 0–0.51)
EOSINOPHIL NFR BLD: 3.7 % (ref 0–6.9)
ERYTHROCYTE [DISTWIDTH] IN BLOOD BY AUTOMATED COUNT: 39.9 FL (ref 35.9–50)
FASTING STATUS PATIENT QL REPORTED: NORMAL
FERRITIN SERPL-MCNC: 97.1 NG/ML (ref 10–291)
FOLATE SERPL-MCNC: >40 NG/ML
GFR SERPLBLD CREATININE-BSD FMLA CKD-EPI: 115 ML/MIN/1.73 M 2
GLOBULIN SER CALC-MCNC: 2.8 G/DL (ref 1.9–3.5)
GLUCOSE SERPL-MCNC: 90 MG/DL (ref 65–99)
HCT VFR BLD AUTO: 44 % (ref 37–47)
HCV AB SER QL: NORMAL
HDLC SERPL-MCNC: 78 MG/DL
HGB BLD-MCNC: 15 G/DL (ref 12–16)
HIV 1+2 AB+HIV1 P24 AG SERPL QL IA: NORMAL
IMM GRANULOCYTES # BLD AUTO: 0.01 K/UL (ref 0–0.11)
IMM GRANULOCYTES NFR BLD AUTO: 0.3 % (ref 0–0.9)
IRON SATN MFR SERPL: 42 % (ref 15–55)
IRON SERPL-MCNC: 134 UG/DL (ref 40–170)
LDLC SERPL CALC-MCNC: 71 MG/DL
LYMPHOCYTES # BLD AUTO: 1.35 K/UL (ref 1–4.8)
LYMPHOCYTES NFR BLD: 35.5 % (ref 22–41)
MCH RBC QN AUTO: 31.8 PG (ref 27–33)
MCHC RBC AUTO-ENTMCNC: 34.1 G/DL (ref 32.2–35.5)
MCV RBC AUTO: 93.4 FL (ref 81.4–97.8)
MONOCYTES # BLD AUTO: 0.36 K/UL (ref 0–0.85)
MONOCYTES NFR BLD AUTO: 9.5 % (ref 0–13.4)
NEUTROPHILS # BLD AUTO: 1.9 K/UL (ref 1.82–7.42)
NEUTROPHILS NFR BLD: 49.9 % (ref 44–72)
NRBC # BLD AUTO: 0 K/UL
NRBC BLD-RTO: 0 /100 WBC (ref 0–0.2)
PLATELET # BLD AUTO: 308 K/UL (ref 164–446)
PMV BLD AUTO: 10.1 FL (ref 9–12.9)
POTASSIUM SERPL-SCNC: 4.4 MMOL/L (ref 3.6–5.5)
PROT SERPL-MCNC: 7 G/DL (ref 6–8.2)
RBC # BLD AUTO: 4.71 M/UL (ref 4.2–5.4)
SODIUM SERPL-SCNC: 140 MMOL/L (ref 135–145)
TIBC SERPL-MCNC: 318 UG/DL (ref 250–450)
TRIGL SERPL-MCNC: 35 MG/DL (ref 0–149)
TSH SERPL DL<=0.005 MIU/L-ACNC: 1.76 UIU/ML (ref 0.38–5.33)
UIBC SERPL-MCNC: 184 UG/DL (ref 110–370)
VIT B12 SERPL-MCNC: 449 PG/ML (ref 211–911)
WBC # BLD AUTO: 3.8 K/UL (ref 4.8–10.8)

## 2025-02-22 PROCEDURE — 82746 ASSAY OF FOLIC ACID SERUM: CPT

## 2025-02-22 PROCEDURE — 83540 ASSAY OF IRON: CPT

## 2025-02-22 PROCEDURE — 36415 COLL VENOUS BLD VENIPUNCTURE: CPT

## 2025-02-22 PROCEDURE — 87389 HIV-1 AG W/HIV-1&-2 AB AG IA: CPT

## 2025-02-22 PROCEDURE — 80053 COMPREHEN METABOLIC PANEL: CPT

## 2025-02-22 PROCEDURE — 86803 HEPATITIS C AB TEST: CPT

## 2025-02-22 PROCEDURE — 83550 IRON BINDING TEST: CPT

## 2025-02-22 PROCEDURE — 85025 COMPLETE CBC W/AUTO DIFF WBC: CPT

## 2025-02-22 PROCEDURE — 82728 ASSAY OF FERRITIN: CPT

## 2025-02-22 PROCEDURE — 84443 ASSAY THYROID STIM HORMONE: CPT

## 2025-02-22 PROCEDURE — 80061 LIPID PANEL: CPT

## 2025-02-22 PROCEDURE — 82607 VITAMIN B-12: CPT

## 2025-02-24 DIAGNOSIS — R42 DIZZINESS: ICD-10-CM

## 2025-02-24 DIAGNOSIS — D72.819 LEUKOPENIA, UNSPECIFIED TYPE: ICD-10-CM

## 2025-02-28 ENCOUNTER — APPOINTMENT (OUTPATIENT)
Dept: RADIOLOGY | Facility: MEDICAL CENTER | Age: 25
End: 2025-02-28
Attending: STUDENT IN AN ORGANIZED HEALTH CARE EDUCATION/TRAINING PROGRAM
Payer: COMMERCIAL

## 2025-02-28 DIAGNOSIS — R42 DIZZINESS: ICD-10-CM

## 2025-02-28 PROCEDURE — 70551 MRI BRAIN STEM W/O DYE: CPT

## 2025-03-03 ENCOUNTER — RESULTS FOLLOW-UP (OUTPATIENT)
Dept: MEDICAL GROUP | Facility: IMAGING CENTER | Age: 25
End: 2025-03-03

## 2025-03-03 DIAGNOSIS — R90.89 ABNORMAL FINDING ON MRI OF BRAIN: ICD-10-CM

## 2025-03-03 DIAGNOSIS — R42 DIZZINESS: ICD-10-CM

## 2025-03-04 ENCOUNTER — TELEMEDICINE (OUTPATIENT)
Dept: MEDICAL GROUP | Facility: IMAGING CENTER | Age: 25
End: 2025-03-04
Payer: COMMERCIAL

## 2025-03-04 VITALS — HEIGHT: 64 IN | WEIGHT: 153 LBS | BODY MASS INDEX: 26.12 KG/M2

## 2025-03-04 DIAGNOSIS — D72.819 LEUKOPENIA, UNSPECIFIED TYPE: ICD-10-CM

## 2025-03-04 DIAGNOSIS — Z71.2 ENCOUNTER TO DISCUSS TEST RESULTS: ICD-10-CM

## 2025-03-04 DIAGNOSIS — G93.89 MASS OF PINEAL REGION: ICD-10-CM

## 2025-03-04 PROCEDURE — 99214 OFFICE O/P EST MOD 30 MIN: CPT | Mod: 95 | Performed by: STUDENT IN AN ORGANIZED HEALTH CARE EDUCATION/TRAINING PROGRAM

## 2025-03-04 ASSESSMENT — FIBROSIS 4 INDEX: FIB4 SCORE: 0.32

## 2025-03-04 ASSESSMENT — PAIN SCALES - GENERAL: PAINLEVEL_OUTOF10: NO PAIN

## 2025-03-04 NOTE — PROGRESS NOTES
"Virtual Visit: Established Patient   This visit was conducted via Teams using secure and encrypted videoconferencing technology. The patient was in a private location in the state of [place].    The patient's identity was confirmed and verbal consent was obtained for this virtual visit.    Subjective:   CC:   Chief Complaint   Patient presents with    Follow-Up     On MRI from 02/28/25       Sima Diallo is a 24 y.o. female presenting for evaluation and management of:    Test results: Patient completed labs and brain MRI without contrast.  Patient is still experiencing dizziness, headaches, and nausea.  Denies syncope.  A brain MRI with contrast is scheduled on May 2.  This was the soonest availability.  However, she is on the cancellation list.    ROS:   See HPI      Medications, allergies, past medical history, family history, surgical history, and social history documented in chart and reviewed by me.        Objective:   Ht 1.626 m (5' 4\")   Wt 69.4 kg (153 lb)   LMP 02/21/2025   BMI 26.26 kg/m²     Physical Exam:   Constitutional: Alert, no distress, well-groomed.  Skin: No rashes in visible areas.  Eyes: Round. Conjunctiva clear, lids normal. No icterus.   ENMT: Lips pink without lesions, moist mucous membranes. Phonation normal.  Neck: No visible masses or thyromegaly.   Respiratory: Unlabored respiratory effort, no cough or audible wheeze.  Psych: Alert and oriented x3, normal affect and mood.       Assessment and Plan:       1. Mass of pineal region  New diagnosis with unknown prognosis.  Diagnosed via brain MRI.  Results were discussed with pt. A brain MRI with contrast and a referral to neurology were ordered upon receiving the MRI results.  The brain MRI with contrast is scheduled on May 2nd but patient is on the cancellation list.  Patient was informed that the referral department will process the referral in 1 or 2 weeks and they will send her a letter on [employer] with the referral.  Patient was " informed to notify me if she does not hear back regarding the referral after 2 weeks.     MR-BRAIN-W/O 3/2025  IMPRESSION:   1.  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 Differential considerations include pineal cyst, pineocytoma, pineal parenchymal tumor, and others. Postcontrast MR sequences are recommended for further evaluation.  2.  No evidence of acute territorial infarct or intracranial hemorrhage.    2. Leukopenia, unspecified type  Acute. New finding.  Unknown etiology.  Patient denies history of leukopenia.  B12 and folic acid are normal.  Recommend repeating the CBC in 3 months.  If leukopenia persists or if it worsens we can do additional workup.   Latest Reference Range & Units 02/22/25 10:24   WBC 4.8 - 10.8 K/uL 3.8 (L)   RBC 4.20 - 5.40 M/uL 4.71   Hemoglobin 12.0 - 16.0 g/dL 15.0   Hematocrit 37.0 - 47.0 % 44.0   MCV 81.4 - 97.8 fL 93.4   MCH 27.0 - 33.0 pg 31.8   MCHC 32.2 - 35.5 g/dL 34.1   RDW 35.9 - 50.0 fL 39.9   Platelet Count 164 - 446 K/uL 308   MPV 9.0 - 12.9 fL 10.1   Neutrophils-Polys 44.00 - 72.00 % 49.90   Neutrophils (Absolute) 1.82 - 7.42 K/uL 1.90   Lymphocytes 22.00 - 41.00 % 35.50   Lymphs (Absolute) 1.00 - 4.80 K/uL 1.35   Monocytes 0.00 - 13.40 % 9.50   Monos (Absolute) 0.00 - 0.85 K/uL 0.36   Eosinophils 0.00 - 6.90 % 3.70   Eos (Absolute) 0.00 - 0.51 K/uL 0.14   Basophils 0.00 - 1.80 % 1.10   Baso (Absolute) 0.00 - 0.12 K/uL 0.04   Immature Granulocytes 0.00 - 0.90 % 0.30   Immature Granulocytes (abs) 0.00 - 0.11 K/uL 0.01   Nucleated RBC 0.00 - 0.20 /100 WBC 0.00   NRBC (Absolute) K/uL 0.00   (L): Data is abnormally low    3. Encounter to discuss test results  CBC, CMP, lipid panel, ferritin, iron panel, B12, folic acid, TSH, hepatitis C, HIV, and brain MRI were reviewed today.          Diagnosis and treatment plan explained to pt.  Pt agreed with treatment plan and verbalized understanding.     Return for f/u after completing tests.     Please note that this dictation was created  using voice recognition software. I have made every reasonable attempt to correct obvious errors, but I expect that there are errors of grammar and possibly content that I did not discover before finalizing the note.    Tamie Joyner PA-C  [facility]

## 2025-03-05 NOTE — Clinical Note
REFERRAL APPROVAL NOTICE         Sent on March 5, 2025                   Sima Diallo  [address]  [place] [address]                   Dear Ms. Diallo,    After a careful review of the medical information and benefit coverage, [facility] has processed your referral. See below for additional details.    If applicable, you must be actively enrolled with your insurance for coverage of the authorized service. If you have any questions regarding your coverage, please contact your insurance directly.    REFERRAL INFORMATION   Referral #:  [id number]  Referred-To Department    Referred-By Provider:  Neurology    Tamie Joyner P.A.-C.   [facility] [place]      [address]  [place] [id number] [phone] [address], [address]  [place] [address] [phone]    Referral Start Date:  03/03/2025  Referral End Date:   03/03/2026           SCHEDULING  If you do not already have an appointment, please call [phone] to make an appointment.   MORE INFORMATION  As a reminder, [facility] ownership has changed, meaning this location is now owned and operated by [facility]. As such, we want to clarify that our patients should expect to receive two separate bills for the services received at [facility] - one representing the [facility] facility fees as the owner of the establishment, and the other to represent the physician's services and subsequent fees. You can speak with your insurance carrier for a pricing estimate by calling the customer service number on the back of your card and ask about charges for a hospital outpatient visit.  If you do not already have a [employer] account, sign up at: [email or web address]  You can access your medical information, make appointments, see lab results, billing information, and more.  If you have questions regarding this referral, please contact  the [facility] Referrals department at:             [phone]. Monday - Friday 7:30AM -  5:00PM.      Sincerely,  [facility]

## 2025-03-06 ENCOUNTER — OFFICE VISIT (OUTPATIENT)
Dept: NEUROLOGY | Facility: MEDICAL CENTER | Age: 25
End: 2025-03-06
Attending: PSYCHIATRY & NEUROLOGY
Payer: COMMERCIAL

## 2025-03-06 VITALS
TEMPERATURE: 99.3 F | RESPIRATION RATE: 16 BRPM | DIASTOLIC BLOOD PRESSURE: 62 MMHG | SYSTOLIC BLOOD PRESSURE: 110 MMHG | HEART RATE: 90 BPM | OXYGEN SATURATION: 90 %

## 2025-03-06 DIAGNOSIS — G93.89 MASS OF PINEAL REGION: ICD-10-CM

## 2025-03-06 DIAGNOSIS — R11.2 NAUSEA AND VOMITING, UNSPECIFIED VOMITING TYPE: ICD-10-CM

## 2025-03-06 PROCEDURE — 3074F SYST BP LT 130 MM HG: CPT | Performed by: PSYCHIATRY & NEUROLOGY

## 2025-03-06 PROCEDURE — 99211 OFF/OP EST MAY X REQ PHY/QHP: CPT | Performed by: PSYCHIATRY & NEUROLOGY

## 2025-03-06 PROCEDURE — 99205 OFFICE O/P NEW HI 60 MIN: CPT | Performed by: PSYCHIATRY & NEUROLOGY

## 2025-03-06 PROCEDURE — 3078F DIAST BP <80 MM HG: CPT | Performed by: PSYCHIATRY & NEUROLOGY

## 2025-03-06 RX ORDER — PROCHLORPERAZINE MALEATE 10 MG
10 TABLET ORAL EVERY 6 HOURS PRN
Qty: 30 TABLET | Refills: 3 | Status: SHIPPED | OUTPATIENT
Start: 2025-03-06

## 2025-03-06 NOTE — PROGRESS NOTES
[facility]    Date of Service: 03/06/25    Referred by: Tamie Joyner P.A.-C.  [address] [place] [address]      Reason for referral  Abnormal finding on MRI brain.     Previously evaluated by a neurologist   No    History of present illness (HPI)   Sima Diallo is a 24 y.o. female with no past medical history. 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     Clumsy and daily headache for the past 2 months. Pressure-like headaches. Sometimes very nauseaus and threw up a couple of times.   Feels like it's hard for her to read things and focusing on things. Also has to zoom on things on the phone.   No diplopia, just blurry.   Sometimes wakes up in the middle of the night with the headache. More severe when moves and strains, like hitting the brakes of the car or ties shoelaces. Sometimes the headache radiates to the ears and the back of her head. Takes tylenol and Ibuprofen all the time, as well as Zofran. Does not really work.   Mentions that for the past 2 months she has no sexual drive and her periods are very heavy. She has been on the same contraceptive method for the past 7 years.      Review of Systems (ROS)  Negative for: Fever, chills, chest pain, shortness of breath, cough, diarrhea, constipation, urinary frequency, dysuria, skin rash, swelling.    Loss of libido, heavy periods.     Medical history  No      Surgical history  Past Surgical History:   Procedure Laterality Date    APPENDECTOMY           Relevant family history  Family History   Problem Relation Age of Onset    Other Mother         hyper thyiriod    Heart Disease Father         MI    Heart Attack Father     No Known Problems Sister     Multiple Sclerosis Brother     Breast Cancer Maternal Grandmother     Cancer Maternal Grandfather         colon and bladder    Heart Disease Paternal Grandfather         MI          Social history  Social History     Tobacco Use    Smoking status: Never    Smokeless tobacco: Never   Substance Use Topics    Alcohol use: Yes     Comment: 2-3 times a month     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     Medications    Outpatient Medications Marked as Taking for the 3/6/25 encounter (Office Visit) with Judd Wu M.D.   Medication Sig Dispense Refill    DAYSEE 0.15-0.03 &0.01 MG Tab Take 1 tablet by mouth every day. TAKE 1 TABLET BY MOUTH DAILY           Screening    Depression Screening    Little interest or pleasure in doing things?     Feeling down, depressed , or hopeless?    Trouble falling or staying asleep, or sleeping too much?     Feeling tired or having little energy?     Poor appetite or overeating?     Feeling bad about yourself - or that you are a failure or have let yourself or your family down?    Trouble concentrating on things, such as reading the newspaper or watching television?    Moving or speaking so slowly that other people could have noticed.  Or the opposite - being so fidgety or restless that you have been moving around a lot more than usual?     Thoughts that you would be better off dead, or of hurting yourself?     Patient Health Questionnaire Score:        If depressive symptoms identified deferred to follow up visit unless specifically addressed in assesment and plan.    Interpretation of PHQ-9 Total Score   Score Severity   1-4 No Depression   5-9 Mild Depression   10-14 Moderate Depression   15-19 Moderately Severe Depression   20-27 Severe Depression    Denies any SI.      Physical exam    Vitals:    03/06/25 1113   BP: 110/62   Pulse: 90   Resp: 16   Temp: 37.4 °C (99.3 °F)   SpO2: 90%       Focused Neurological Exam  Alert and oriented, normal speech.  No cranial neuropathies. EOM are intact. Not able to do a funduscopic exam. There is no light-near dissociation or convergence nystagmus. There is no upward gaze paralysis,  but it bothers her and makes her dizzy.  Muscle strength and sensory exam are normal.   Normal gait, tandem walk, stands on heels and toes without issues.       Lab Results  Lab Results   Component Value Date/Time    WBC 3.8 (L) 02/22/2025 10:24 AM    RBC 4.71 02/22/2025 10:24 AM    HEMOGLOBIN 15.0 02/22/2025 10:24 AM    HEMATOCRIT 44.0 02/22/2025 10:24 AM    MCV 93.4 02/22/2025 10:24 AM    MCH 31.8 02/22/2025 10:24 AM    MCHC 34.1 02/22/2025 10:24 AM    MPV 10.1 02/22/2025 10:24 AM    NEUTSPOLYS 49.90 02/22/2025 10:24 AM    LYMPHOCYTES 35.50 02/22/2025 10:24 AM    MONOCYTES 9.50 02/22/2025 10:24 AM    EOSINOPHILS 3.70 02/22/2025 10:24 AM    BASOPHILS 1.10 02/22/2025 10:24 AM          Lab Results   Component Value Date/Time    SODIUM 140 02/22/2025 10:24 AM    POTASSIUM 4.4 02/22/2025 10:24 AM    CHLORIDE 104 02/22/2025 10:24 AM    CO2 25 02/22/2025 10:24 AM    GLUCOSE 90 02/22/2025 10:24 AM    BUN 13 02/22/2025 10:24 AM    CREATININE 0.74 02/22/2025 10:24 AM            NEURO-DIAGNOSTICS  MRI brain: 2/28/25  1.  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 Differential considerations include pineal cyst, pineocytoma, pineal parenchymal tumor, and others. Postcontrast MR sequences are recommended for further evaluation.  2.  No evidence of acute territorial infarct or intracranial hemorrhage.        Impression and Plan  Briefly, 24 y.o. female with 2 months of pressure-like headaches, nausea/vomiting, loss of libido and heavy menstrual periods. MRI brain showed a pineal mass with cystic components, no signs of obstructive hydrocephalus. Differentials include pineocytoma, pineal cyst, pineoblastoma, germinoma, less likely a glioma. Her exam is normal today, although she has discomfort with upward gaze but I did not see any evidence of Parinaud syndrome. She is awaiting an MRI of the brain with contrast tomorrow. I will try to get those scans into our system and present her in TB next week. Plan for a telemedicine encounter after  that to discuss results and next steps.   I prescribed compazine to help with nausea. I would also like her to see neuro-ophthalmology to assess for papilledema and other visual abnormalities.     1. Mass of pineal region  - Referral to Neuro Ophthalmology  - MRI brain with contrast tomorrow, discuss at tumor board.    2. Nausea and vomiting, unspecified vomiting type  - prochlorperazine (COMPAZINE) 10 MG Tab; Take 1 Tablet by mouth every 6 hours as needed for Nausea/Vomiting.  Dispense: 30 Tablet; Refill: 3      Numerous questions were answered to the best of my knowledge. The patient is in agreement with the plan. Return to the clinic next week (801)    ADMINISTRATIVE BILLING  I spent a total of 60 minutes on this patient encounter.      Judd Wu MD  Diplomate of the American Board of Psychiatry and Neurology.  General Neurology & Neuro-Oncology.  [facility].   of Clinical Neurology at [facility].

## 2025-03-07 ENCOUNTER — HOSPITAL ENCOUNTER (OUTPATIENT)
Dept: RADIOLOGY | Facility: MEDICAL CENTER | Age: 25
End: 2025-03-07
Attending: STUDENT IN AN ORGANIZED HEALTH CARE EDUCATION/TRAINING PROGRAM

## 2025-03-07 ENCOUNTER — APPOINTMENT (OUTPATIENT)
Dept: MEDICAL GROUP | Facility: IMAGING CENTER | Age: 25
End: 2025-03-07
Payer: COMMERCIAL

## 2025-03-07 NOTE — Clinical Note
REFERRAL APPROVAL NOTICE         Sent on March 7, 2025                   Sima Diallo  [address] [place] [address]                   Dear Ms. Diallo,    After a careful review of the medical information and benefit coverage, [facility] has processed your referral. See below for additional details.    If applicable, you must be actively enrolled with your insurance for coverage of the authorized service. If you have any questions regarding your coverage, please contact your insurance directly.    REFERRAL INFORMATION   Referral #:  [id number]  Referred-To Department    Referred-By Provider:  Neuro-Opthamology    Judd Wu M.D.   Ophthalmology Med [facility]      [address]  [place] [address]  [phone] [address], [address]  [place] [id number] [phone]    Referral Start Date:  03/07/2025  Referral End Date:   03/07/2026             SCHEDULING  If you do not already have an appointment, please call [phone] to make an appointment.     MORE INFORMATION  If you do not already have a [employer] account, sign up at: [email or web address]  You can access your medical information, make appointments, see lab results, billing information, and more.  If you have questions regarding this referral, please contact  the [facility] Referrals department at:             [phone]. Monday - Friday 8:00AM - 5:00PM.     Sincerely,    [facility]

## 2025-03-17 DIAGNOSIS — G93.89 MASS OF PINEAL REGION: ICD-10-CM

## 2025-03-18 ENCOUNTER — TELEPHONE (OUTPATIENT)
Dept: HEMATOLOGY ONCOLOGY | Facility: MEDICAL CENTER | Age: 25
End: 2025-03-18
Payer: COMMERCIAL

## 2025-03-19 ENCOUNTER — HOSPITAL ENCOUNTER (OUTPATIENT)
Dept: LAB | Facility: MEDICAL CENTER | Age: 25
End: 2025-03-19
Attending: PSYCHIATRY & NEUROLOGY
Payer: COMMERCIAL

## 2025-03-19 DIAGNOSIS — G93.89 MASS OF PINEAL REGION: ICD-10-CM

## 2025-03-19 LAB — B-HCG SERPL-ACNC: <1 MIU/ML (ref 0–5)

## 2025-03-19 PROCEDURE — 36415 COLL VENOUS BLD VENIPUNCTURE: CPT

## 2025-03-19 PROCEDURE — 84702 CHORIONIC GONADOTROPIN TEST: CPT

## 2025-03-19 PROCEDURE — 82105 ALPHA-FETOPROTEIN SERUM: CPT

## 2025-03-21 LAB — AFP-TM SERPL-MCNC: 1 NG/ML (ref 0–9)

## 2025-03-24 ENCOUNTER — TELEPHONE (OUTPATIENT)
Dept: HEMATOLOGY ONCOLOGY | Facility: MEDICAL CENTER | Age: 25
End: 2025-03-24
Payer: COMMERCIAL

## 2025-03-31 ENCOUNTER — TELEPHONE (OUTPATIENT)
Dept: HEMATOLOGY ONCOLOGY | Facility: MEDICAL CENTER | Age: 25
End: 2025-03-31
Payer: COMMERCIAL

## 2025-04-03 ENCOUNTER — HOSPITAL ENCOUNTER (OUTPATIENT)
Dept: HEMATOLOGY ONCOLOGY | Facility: MEDICAL CENTER | Age: 25
End: 2025-04-03
Attending: PSYCHIATRY & NEUROLOGY
Payer: COMMERCIAL

## 2025-04-03 ENCOUNTER — OFFICE VISIT (OUTPATIENT)
Dept: OPHTHALMOLOGY | Facility: MEDICAL CENTER | Age: 25
End: 2025-04-03
Payer: COMMERCIAL

## 2025-04-03 DIAGNOSIS — G93.89 MASS OF PINEAL REGION: ICD-10-CM

## 2025-04-03 DIAGNOSIS — G44.40 MEDICATION OVERUSE HEADACHE: ICD-10-CM

## 2025-04-03 DIAGNOSIS — G43.709 CHRONIC MIGRAINE WITHOUT AURA WITHOUT STATUS MIGRAINOSUS, NOT INTRACTABLE: ICD-10-CM

## 2025-04-03 DIAGNOSIS — H51.11 CONVERGENCE INSUFFICIENCY: ICD-10-CM

## 2025-04-03 PROCEDURE — 92060 SENSORIMOTOR EXAMINATION: CPT | Performed by: STUDENT IN AN ORGANIZED HEALTH CARE EDUCATION/TRAINING PROGRAM

## 2025-04-03 PROCEDURE — 92250 FUNDUS PHOTOGRAPHY W/I&R: CPT | Performed by: STUDENT IN AN ORGANIZED HEALTH CARE EDUCATION/TRAINING PROGRAM

## 2025-04-03 PROCEDURE — 99205 OFFICE O/P NEW HI 60 MIN: CPT | Mod: 25 | Performed by: STUDENT IN AN ORGANIZED HEALTH CARE EDUCATION/TRAINING PROGRAM

## 2025-04-03 RX ORDER — AMITRIPTYLINE HYDROCHLORIDE 10 MG/1
10 TABLET ORAL NIGHTLY
Qty: 30 TABLET | Refills: 5 | Status: SHIPPED | OUTPATIENT
Start: 2025-04-03 | End: 2025-04-18

## 2025-04-03 ASSESSMENT — CUP TO DISC RATIO
OS_RATIO: 0.2
OD_RATIO: 0.2

## 2025-04-03 ASSESSMENT — REFRACTION_MANIFEST
OS_SPHERE: +0.25
OS_AXIS: 096
OS_CYLINDER: +0.50
OD_AXIS: 093
OD_CYLINDER: +0.50
OD_SPHERE: +0.25

## 2025-04-03 ASSESSMENT — CONF VISUAL FIELD
OD_SUPERIOR_NASAL_RESTRICTION: 0
OS_INFERIOR_NASAL_RESTRICTION: 0
OS_NORMAL: 1
OS_INFERIOR_TEMPORAL_RESTRICTION: 0
OD_NORMAL: 1
OD_SUPERIOR_TEMPORAL_RESTRICTION: 0
OS_SUPERIOR_TEMPORAL_RESTRICTION: 0
OS_SUPERIOR_NASAL_RESTRICTION: 0
OD_INFERIOR_TEMPORAL_RESTRICTION: 0
OD_INFERIOR_NASAL_RESTRICTION: 0

## 2025-04-03 ASSESSMENT — EXTERNAL EXAM - LEFT EYE: OS_EXAM: NORMAL

## 2025-04-03 ASSESSMENT — VISUAL ACUITY
METHOD: SNELLEN - LINEAR
OS_SC: 20/20
OD_SC: 20/20-1

## 2025-04-03 ASSESSMENT — EXTERNAL EXAM - RIGHT EYE: OD_EXAM: NORMAL

## 2025-04-03 ASSESSMENT — TONOMETRY
OD_IOP_MMHG: 12
OS_IOP_MMHG: 12

## 2025-04-03 ASSESSMENT — SLIT LAMP EXAM - LIDS
COMMENTS: NORMAL
COMMENTS: NORMAL

## 2025-04-03 ASSESSMENT — ENCOUNTER SYMPTOMS: HEADACHES: 1

## 2025-04-03 NOTE — ASSESSMENT & PLAN NOTE
Reports use of tylenol 500mg TID and ibuprofen 400mg q6h daily for headache management. Discussed medication overuse headaches, and recommended limitation of abortive therapies to < 3 times a week

## 2025-04-03 NOTE — ASSESSMENT & PLAN NOTE
Onset: 1 year ago, worsening over the past 6 month   Severity: 3/10-10/10  Characteristic: pressure  Location: periorbital  Frequency: daily  Associated symptoms: eye pain, nausea, dizziness (room spinning), blurred vision  (equal in both eyes), sound sensitivity  Abortive: tylenol (500mg TID), ibuprofen (400mg q6h)    Plan:   Headaches are consistent with chronic migraine and are less likely associated with her pineal mass. Given her sensitivity to medications, patient will likely not tolerate topamax or TCA trial. She reports she is already on magnesium nightly. Recommended consideration of botox or CGRP inhibitors to see if symptoms improve.

## 2025-04-03 NOTE — ASSESSMENT & PLAN NOTE
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 Prefers 1 base in at near    Plan:  -Pt to return with plano lenses for 1 base in trial

## 2025-04-03 NOTE — PROGRESS NOTES
Peds/Neuro Ophthalmology:   Kevin Hough    Date & Time note created:    4/3/2025   8:12 AM     Referring MD / APRN:  Tamie Joyner P.A.-C., No att. providers found    Patient ID:  Name:             Sima Diallo   YOB: 2000  Age:                 24 y.o.  female   MRN:               [id number]    Chief Complaint/Reason for Visit:     Other (Mass in brain)      History of Present Illness:    Sima Diallo is a 24 y.o. female   Other  Associated symptoms include headaches.       Review of Systems:  Review of Systems   Neurological:  Positive for headaches.   All other systems reviewed and are negative.      Past Medical History:   History reviewed. No pertinent past medical history.    Past Surgical History:  Past Surgical History:   Procedure Laterality Date    APPENDECTOMY         Current Outpatient Medications:  Current Outpatient Medications   Medication Sig Dispense Refill    prochlorperazine (COMPAZINE) 10 MG Tab Take 1 Tablet by mouth every 6 hours as needed for Nausea/Vomiting. 30 Tablet 3    DAYSEE 0.15-0.03 &0.01 MG Tab Take 1 tablet by mouth every day. TAKE 1 TABLET BY MOUTH DAILY       No current facility-administered medications for this visit.       Allergies:  Allergies   Allergen Reactions    Cefdinir Rash     Rash.       Family History:  Family History   Problem Relation Age of Onset    Other Mother         hyper thyiriod    Glasses Mother     Other Father     Heart Disease Father         MI    Heart Attack Father     No Known Problems Sister     Multiple Sclerosis Brother     Breast Cancer Maternal Grandmother     Cancer Maternal Grandfather         colon and bladder    Glaucoma Maternal Grandfather     Heart Disease Paternal Grandfather         MI       Social History:  Social History     Socioeconomic History    Marital status: Single     Spouse name: Not on file    Number of children: Not on file    Years of education: Not on file    Highest education level:  Not on file   Occupational History    Not on file   Tobacco Use    Smoking status: Never    Smokeless tobacco: Never   Vaping Use    Vaping status: Never Used   Substance and Sexual Activity    Alcohol use: Yes     Comment: 2-3 times a month    Drug use: Not Currently    Sexual activity: Yes     Partners: Male     Birth control/protection: Pill   Other Topics Concern    Not on file   Social History Narrative    Nurse     Social Drivers of Health     Financial Resource Strain: Not on File (10/21/2024)    Received from [facility]    Financial Resource Strain     Financial Resource Strain: 0   Food Insecurity: Not on File (10/21/2024)    Received from [facility]    Food Insecurity     Food: 0   Transportation Needs: Not on File (10/21/2024)    Received from [facility]    Transportation Needs     Transportation: 0   Physical Activity: Not on File (10/21/2024)    Received from [facility]    Physical Activity     Physical Activity: 0   Stress: Not on File (10/21/2024)    Received from [facility]    Stress     Stress: 0   Social Connections: Not on File (10/21/2024)    Received from [facility]    Social Connections     Connectedness: 0   Intimate Partner Violence: Not on file   Housing Stability: Not on File (10/21/2024)    Received from [facility]    Housing Stability     Housin          Physical Exam:  Physical Exam    Oriented x 3  Weight/BMI: There is no height or weight on file to calculate BMI.  There were no vitals taken for this visit.    Base Eye Exam       Visual Acuity (Snellen - Linear)         Right Left    Dist sc 20/20-1 20/20              Tonometry ( care, 8:09 AM)         Right Left    Pressure 12 12              Pupils         Pupils    Right PERRL    Left PERRL                  Additional Tests       Color         Right Left    Ishihara //              Stereo       Fly: +    Animals: 3/3    Circles:                   Refraction       Manifest Refraction         Sphere Cylinder Axis    Right +0.25 +0.50 093    Left +0.25 +0.50  096                    Pertinent Lab/Test/Imaging Review:      Assessment and Plan:     No problem-specific Assessment & Plan notes found for this encounter.        Kevin Houhg

## 2025-04-03 NOTE — PROGRESS NOTES
Peds/Neuro Ophthalmology:   uPja Cleveland M.D.    Date & Time note created:    4/3/2025   10:16 AM     Referring MD / APRN:  Tamie Joyner P.A.-C., No att. providers found    Patient ID:  Name:             Sima Diallo   YOB: 2000  Age:                 24 y.o.  female   MRN:               [id number]    Chief Complaint/Reason for Visit:     Other (Mass in brain)      History of Present Illness:      Sima Diallo is a 23 yo woman who presents for new patient evaluation of pineal mass.     She is referred by Judd Wu. The pineal mass was found incidentally on work up for headaches 2/2025    Sima reports her visual concerns are only present during her severe headaches. She endorses blurred vision and room spinning sensation with her severe headaches. The headaches are described below. Outside these episodes she does endorse occasional eye strain especially when working on her computer or looking at her phone. She denies any double vision, oscillopsia      Headache description   Onset: 1 year ago, worsening over the past 6 month   Severity: 3/10-10/10  Characteristic: pressure  Location: periorbital  Frequency: daily  Associated symptoms: eye pain, nausea, dizziness (room spinning), blurred vision  (equal in both eyes), sound sensitivity  Abortive: tylenol (500mg TID), ibuprofen (400mg q6h)            Review of Systems:  ROS    Past Medical History:   History reviewed. No pertinent past medical history.    Past Surgical History:  Past Surgical History:   Procedure Laterality Date    APPENDECTOMY         Current Outpatient Medications:  Current Outpatient Medications   Medication Sig Dispense Refill    prochlorperazine (COMPAZINE) 10 MG Tab Take 1 Tablet by mouth every 6 hours as needed for Nausea/Vomiting. 30 Tablet 3    DAYSEE 0.15-0.03 &0.01 MG Tab Take 1 tablet by mouth every day. TAKE 1 TABLET BY MOUTH DAILY       No current facility-administered medications for this visit.        Allergies:  Allergies   Allergen Reactions    Cefdinir Rash     Rash.       Family History:  Family History   Problem Relation Age of Onset    Other Mother         hyper thyiriod    Glasses Mother     Other Father     Heart Disease Father         MI    Heart Attack Father     No Known Problems Sister     Multiple Sclerosis Brother     Breast Cancer Maternal Grandmother     Cancer Maternal Grandfather         colon and bladder    Glaucoma Maternal Grandfather     Heart Disease Paternal Grandfather         MI       Social History:  Social History     Socioeconomic History    Marital status: Single     Spouse name: Not on file    Number of children: Not on file    Years of education: Not on file    Highest education level: Not on file   Occupational History    Not on file   Tobacco Use    Smoking status: Never    Smokeless tobacco: Never   Vaping Use    Vaping status: Never Used   Substance and Sexual Activity    Alcohol use: Yes     Comment: 2-3 times a month    Drug use: Not Currently    Sexual activity: Yes     Partners: Male     Birth control/protection: Pill   Other Topics Concern    Not on file   Social History Narrative    Nurse     Social Drivers of Health     Financial Resource Strain: Not on File (10/21/2024)    Received from [facility]    Financial Resource Strain     Financial Resource Strain: 0   Food Insecurity: Not on File (10/21/2024)    Received from [facility]    Food Insecurity     Food: 0   Transportation Needs: Not on File (10/21/2024)    Received from [facility]    Transportation Needs     Transportation: 0   Physical Activity: Not on File (10/21/2024)    Received from [facility]    Physical Activity     Physical Activity: 0   Stress: Not on File (10/21/2024)    Received from [facility]    Stress     Stress: 0   Social Connections: Not on File (10/21/2024)    Received from [facility]    Social Connections     Connectedness: 0   Intimate Partner Violence: Not on file   Housing Stability: Not on File (10/21/2024)     Received from [facility]     Housin          Physical Exam:  Physical Exam    Oriented x 3  Weight/BMI: There is no height or weight on file to calculate BMI.  There were no vitals taken for this visit.    Base Eye Exam       Visual Acuity (Snellen - Linear)         Right Left    Dist sc 20/20-1 20/20              Tonometry ( care, 8:09 AM)         Right Left    Pressure 12 12              Pupils         Pupils Dark Light Shape React APD    Right PERRL 5 4 Round Brisk None    Left PERRL 5 4 Round Brisk None              Visual Fields         Right Left     Full Full              Extraocular Movement         Right Left     Full Full              Neuro/Psych       Oriented x3: Yes    Mood/Affect: Normal                  Additional Tests       Color         Right Left    Ishihara               Stereo       Fly: +    Animals: 3/3    Circles:               Fusional Vergence       Near point of convergence: 12 cm                  Strabismus Exam       Method: Alternate cover    Correction: sc      Distance Near Near +3DS N Bifocals            X' 12          - - - - - -  Ortho  - - - - - -                      Ortho  - -  - -  Ortho  - -  - -  Ortho                      - - - - - -  Ortho  - - - - - -                Prefers 1 base in at near  No convergence retraction nystagmus       Slit Lamp and Fundus Exam       External Exam         Right Left    External Normal Normal              Slit Lamp Exam         Right Left    Lids/Lashes Normal Normal    Conjunctiva/Sclera White and quiet White and quiet    Cornea Clear Clear    Anterior Chamber Deep and quiet Deep and quiet    Iris Round and reactive Round and reactive    Lens Clear Clear              Fundus Exam         Right Left    Disc pink, sharp disc margins, flat pink, sharp disc margins, flat    C/D Ratio 0.2 0.2    Macula Normal Normal                  Refraction       Manifest Refraction         Sphere Cylinder Axis    Right +0.25  +0.50 093    Left +0.25 +0.50 096                    Pertinent Lab/Test/Imaging Review:    MRI brain o 2/28/25:   [id number] pineal mass with cystic components. No ventriculomegaly     Assessment and Plan:     Mass of pineal region  25 yo healthy woman presenting for evaluation of dizziness and visual disturbances in the setting of pineal mass    10x11 mm cystic pineal mass noted on MRI brain during work up for chronic headaches. No evidence of flow obstruction  Discussed at tumor board and thought to be benign  Pending tumor markers    Exam 4/3/25: No APD, VA 20/20-1 OD 20/20 OS, full CF, Full EOM, normal OK without convergence retraction nystagmus. Color plates 9/9 OU. +Convergence insufficiency. Optic nerves pink with sharp disc margins. RNFL 104  OS    Plan:   Exam demonstrates no evidence of papilledema or efferent abnormalities from a dorsal midbrain syndrome. MRI reviewed and negative for flow obstruction or hydrocephalus. Discussed with patient that given normal exam, low suspicion pineal mass is contributing to her symptoms. Would try migraine preventative medications to see if symptoms improve. Additionally may benefit for near prism due to a mild convergence insufficiency    -RTC in 6 months   -Surveillance with Dr Wu      Convergence insufficiency  Endorses increased strain with near work or when on computer. 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 Prefers 1 base in at near    Plan:  -Pt to return with plano lenses for 1 base in trial     Chronic migraine without aura without status migrainosus, not intractable  Onset: 1 year ago, worsening over the past 6 month   Severity: 3/10-10/10  Characteristic: pressure  Location: periorbital  Frequency: daily  Associated symptoms: eye pain, nausea, dizziness (room spinning), blurred vision  (equal in both eyes), sound sensitivity  Abortive: tylenol (500mg TID), ibuprofen (400mg q6h)    Plan:   Headaches are consistent  with chronic migraine and are less likely associated with her pineal mass. Given her sensitivity to medications, patient will likely not tolerate topamax or TCA trial. She reports she is already on magnesium nightly. Recommended consideration of botox or CGRP inhibitors to see if symptoms improve.     Medication overuse headache  Reports use of tylenol 500mg TID and ibuprofen 400mg q6h daily for headache management. Discussed medication overuse headaches, and recommended limitation of abortive therapies to < 3 times a week        Puja Cleveland M.D.    61 total minutes were spent reviewing imaging, records, examining the patient and documenting.

## 2025-04-03 NOTE — PROGRESS NOTES
[facility]  NEUROLOGY TELEMEDICINE ENCOUNTER    Date: 04/03/25  Last office visit: 3/6/25  Patient location: Car.    Chief complain: Pineal region mass, headache.    Follow up:   Sima was seen on a virtual encounter for follow up of a pineal region mass lesion and headaches. She is with her mother in the car. She completed serum testing for germinal cell tumors, which came back normal.     Headaches for about a year, now are constant for the past 6 months, every day. However they are about the same (2-3/10) throughout the day. The headache is mainly in the front and radiates to the back, affecting both sides. Sometimes gets blurry vision. Lights don't really bother her that much, sometimes loud noises. Sometimes, when it is really bad and causes nausea, it can take an hour or a couple of hours to go away. Not drinking coffee anymore, staying hydrated.   Right now she is on taking Tylenol and Ibuprofen. She uses more of the Tylenol and occasionally Ibuprofen.   Compazine helps with the nausea.       Medications:     Current Outpatient Medications:     prochlorperazine, 10 mg, Oral, Q6HRS PRN    Daysee, 1 Tablet, Oral, DAILY      Exam: Limited exam   In no distress. Normal breathing.  Alert and oriented to name, time, place, and situation.   Speech fluency and comprehension are intact.  There are no cranial neuropathies appreciated.     Laboratory results:   Lab Results   Component Value Date/Time    SODIUM 140 02/22/2025 10:24 AM    POTASSIUM 4.4 02/22/2025 10:24 AM    CHLORIDE 104 02/22/2025 10:24 AM    CO2 25 02/22/2025 10:24 AM    GLUCOSE 90 02/22/2025 10:24 AM    BUN 13 02/22/2025 10:24 AM    CREATININE 0.74 02/22/2025 10:24 AM        Lab Results   Component Value Date/Time    WBC 3.8 (L) 02/22/2025 10:24 AM    RBC 4.71 02/22/2025 10:24 AM    HEMOGLOBIN 15.0 02/22/2025 10:24 AM    HEMATOCRIT 44.0 02/22/2025 10:24 AM    MCV 93.4 02/22/2025 10:24 AM    MCH 31.8 02/22/2025 10:24 AM    MCHC 34.1 02/22/2025 10:24  AM    MPV 10.1 02/22/2025 10:24 AM    NEUTSPOLYS 49.90 02/22/2025 10:24 AM    LYMPHOCYTES 35.50 02/22/2025 10:24 AM    MONOCYTES 9.50 02/22/2025 10:24 AM    EOSINOPHILS 3.70 02/22/2025 10:24 AM    BASOPHILS 1.10 02/22/2025 10:24 AM          Component  Ref Range & Units (hover) 2 wk ago   Alpha Fetoprotein 1        Component  Ref Range & Units (hover) 2 wk ago   Bhcg <1.0       Imaging studies: No new      Impression:   Briefly, 24 y.o. female with a pineal region mass and headaches for the past 6-12 months, which happen every day.   Serum studies for the pineal mass came back normal, ruling out a germinal tumor. This is likely a benign mass like a pineocytoma or pineal cyst. I suggested we keep an eye on it with a repeat MRI in 6 months.   For the headaches, I will start her on a low dose Amitriptyline at night and she will continue to use Compazine PRN for nausea.   She can also take magnesium oxide 400 mg and Riboflavin 400 mg QD      Numerous questions were answered. The patient agrees with the plan and will return to office in 3 months.     ADMINISTRATIVE BILLING  I personally spent a total of 30 minutes for this encounter.      Judd Wu MD  Diplomate, American Board of Psychiatry and Neurology.  Neurologist & Neuro-Oncologist.  [facility], Department of Neurology and Oncology.   of Clinical Neurology at [facility].

## 2025-04-03 NOTE — ASSESSMENT & PLAN NOTE
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 No evidence of flow obstruction  Discussed at tumor board and thought to be benign  Pending tumor markers    Exam 4/3/25: No APD, VA 20/20-1 OD 20/20 OS, full CF, Full EOM, normal OK without convergence retraction nystagmus. Color plates 9/9 OU. +Convergence insufficiency. Optic nerves pink with sharp disc margins. RNFL 104  OS    Plan:   Exam demonstrates no evidence of papilledema or efferent abnormalities from a dorsal midbrain syndrome. MRI reviewed and negative for flow obstruction or hydrocephalus. Discussed with patient that given normal exam, low suspicion pineal mass is contributing to her symptoms. Would try migraine preventative medications to see if symptoms improve. Additionally may benefit for near prism due to a mild convergence insufficiency    -RTC in 6 months   -Surveillance with Dr Wu

## 2025-04-17 ENCOUNTER — OFFICE VISIT (OUTPATIENT)
Dept: OPHTHALMOLOGY | Facility: MEDICAL CENTER | Age: 25
End: 2025-04-17
Payer: COMMERCIAL

## 2025-04-17 DIAGNOSIS — G43.709 CHRONIC MIGRAINE WITHOUT AURA WITHOUT STATUS MIGRAINOSUS, NOT INTRACTABLE: ICD-10-CM

## 2025-04-17 DIAGNOSIS — H51.11 CONVERGENCE INSUFFICIENCY: ICD-10-CM

## 2025-04-17 DIAGNOSIS — G44.40 MEDICATION OVERUSE HEADACHE: ICD-10-CM

## 2025-04-17 DIAGNOSIS — G93.89 MASS OF PINEAL REGION: ICD-10-CM

## 2025-04-17 PROCEDURE — 99213 OFFICE O/P EST LOW 20 MIN: CPT | Performed by: STUDENT IN AN ORGANIZED HEALTH CARE EDUCATION/TRAINING PROGRAM

## 2025-04-17 NOTE — PROGRESS NOTES
Peds/Neuro Ophthalmology:   Puja Cleveland M.D.    Date & Time note created:    4/17/2025   8:03 AM     Referring MD / APRN:  Tamie Joyner P.A.-C., No att. providers found    Patient ID:  Name:             Sima Diallo   YOB: 2000  Age:                 24 y.o.  female   MRN:               [id number]    Chief Complaint/Reason for Visit:     No chief complaint on file.      History of Present Illness:      Sima Diallo is a 25 yo woman who presents for follow up of fresnel trial     Sima reports increased eye strain at near especially when working on computers/screens. She brought in a pair of plano lenses for a fresnel trial     As a recap,   She is referred by Judd Wu. The pineal mass was found incidentally on work up for headaches 2/2025    Sima reports her visual concerns are only present during her severe headaches. She endorses blurred vision and room spinning sensation with her severe headaches. The headaches are described below. Outside these episodes she does endorse occasional eye strain especially when working on her computer or looking at her phone. She denies any double vision, oscillopsia      Exam demonstrates no evidence of papilledema or efferent abnormalities from a dorsal midbrain syndrome. MRI reviewed and negative for flow obstruction or hydrocephalus. Discussed with patient that given normal exam, low suspicion pineal mass is contributing to her symptoms. Would try migraine preventative medications to see if symptoms improve. Additionally may benefit for near prism due to a mild convergence insufficiency (1 base in)     Headache description   Onset: 1 year ago, worsening over the past 6 month   Severity: 3/10-10/10  Characteristic: pressure  Location: periorbital  Frequency: daily  Associated symptoms: eye pain, nausea, dizziness (room spinning), blurred vision  (equal in both eyes), sound sensitivity  Abortive: tylenol (500mg TID), ibuprofen (400mg  q6h)            Review of Systems:  ROS    Past Medical History:   No past medical history on file.    Past Surgical History:  Past Surgical History:   Procedure Laterality Date    APPENDECTOMY         Current Outpatient Medications:  Current Outpatient Medications   Medication Sig Dispense Refill    amitriptyline (ELAVIL) 10 MG Tab Take 1 Tablet by mouth every evening. 30 Tablet 5    prochlorperazine (COMPAZINE) 10 MG Tab Take 1 Tablet by mouth every 6 hours as needed for Nausea/Vomiting. 30 Tablet 3    DAYSEE 0.15-0.03 &0.01 MG Tab Take 1 tablet by mouth every day. TAKE 1 TABLET BY MOUTH DAILY       No current facility-administered medications for this visit.       Allergies:  Allergies   Allergen Reactions    Cefdinir Rash     Rash.       Family History:  Family History   Problem Relation Age of Onset    Other Mother         hyper thyiriod    Glasses Mother     Other Father     Heart Disease Father         MI    Heart Attack Father     No Known Problems Sister     Multiple Sclerosis Brother     Breast Cancer Maternal Grandmother     Cancer Maternal Grandfather         colon and bladder    Glaucoma Maternal Grandfather     Heart Disease Paternal Grandfather         MI       Social History:  Social History     Socioeconomic History    Marital status: Single     Spouse name: Not on file    Number of children: Not on file    Years of education: Not on file    Highest education level: Not on file   Occupational History    Not on file   Tobacco Use    Smoking status: Never    Smokeless tobacco: Never   Vaping Use    Vaping status: Never Used   Substance and Sexual Activity    Alcohol use: Yes     Comment: 2-3 times a month    Drug use: Not Currently    Sexual activity: Yes     Partners: Male     Birth control/protection: Pill   Other Topics Concern    Not on file   Social History Narrative    Nurse     Social Drivers of Health     Financial Resource Strain: Not on File (10/21/2024)    Received from [employer]  Resource Strain     Financial Resource Strain: 0   Food Insecurity: Not on File (10/21/2024)    Received from [employer]    Food Insecurity     Food: 0   Transportation Needs: Not on File (10/21/2024)    Received from [employer]    Transportation Needs     Transportation: 0   Physical Activity: Not on File (10/21/2024)    Received from [employer]    Physical Activity     Physical Activity: 0   Stress: Not on File (10/21/2024)    Received from [employer]    Stress     Stress: 0   Social Connections: Not on File (10/21/2024)    Received from [employer]    Social Connections     Connectedness: 0   Intimate Partner Violence: Not on file   Housing Stability: Not on File (10/21/2024)    Received from [employer]    Housing Stability     Housin          Physical Exam:  Physical Exam    Oriented x 3  Weight/BMI: There is no height or weight on file to calculate BMI.  There were no vitals taken for this visit.    Additional Tests       Fusional Vergence         Convergence Divergence    Distance      Near 8 / SETH 14 / BI      Near point of convergence: >20                  Strabismus Exam       Distance Near Near +3DS N Bifocals            X' 4          - - - - - -   - - - - - -                       - -  - -  Ortho  - -  - -                       - - - - - -   - - - - - -                         Pertinent Lab/Test/Imaging Review:    MRI brain wwo 25:   [id number] pineal mass with cystic components. No ventriculomegaly     Assessment and Plan:     Convergence insufficiency  Endorses increased strain with near work or when on computer. Works as a nurse.     Exam 4/3/25: XT 12 prism diopters with 12 cm NPC. Prefers 1 base in at near    Exam 25: XT 4 prism diopters with 15 cm NPC. 8 SETH fusional vergence    Plan:  -tried 1 base in trial without significant improvement in symptoms. Given strain/fatigue with screens recommended trial of FL41 lenses and artificial tears . Will defer application of prism at this time. If patient notices worsening  symptoms to retrun.    Mass of pineal region  25 yo healthy woman presenting for evaluation of dizziness and visual disturbances in the setting of pineal mass    10x11 mm cystic pineal mass noted on MRI brain during work up for chronic headaches. No evidence of flow obstruction  Discussed at tumor board and thought to be benign  Pending tumor markers    Exam 4/3/25: No APD, VA 20/20-1 OD 20/20 OS, full CF, Full EOM, normal OK without convergence retraction nystagmus. Color plates 9/9 OU. +Convergence insufficiency. Optic nerves pink with sharp disc margins. RNFL 104  OS    Plan:   Exam demonstrates no evidence of papilledema or efferent abnormalities from a dorsal midbrain syndrome. MRI reviewed and negative for flow obstruction or hydrocephalus. Discussed with patient that given normal exam, low suspicion pineal mass is contributing to her symptoms. Would try migraine preventative medications to see if symptoms improve. Additionally may benefit for near prism due to a mild convergence insufficiency    -RTC in 6 months   -Surveillance with Dr Wu      Medication overuse headache  Reports use of tylenol 500mg TID and ibuprofen 400mg q6h daily for headache management. Discussed medication overuse headaches, and recommended limitation of abortive therapies to < 3 times a week    Chronic migraine without aura without status migrainosus, not intractable  Onset: 1 year ago, worsening over the past 6 month   Severity: 3/10-10/10  Characteristic: pressure  Location: periorbital  Frequency: daily  Associated symptoms: eye pain, nausea, dizziness (room spinning), blurred vision  (equal in both eyes), sound sensitivity  Abortive: tylenol (500mg TID), ibuprofen (400mg q6h)    Plan:   Headaches are consistent with chronic migraine and are less likely associated with her pineal mass. Given her sensitivity to medications, patient will likely not tolerate topamax or TCA trial. She reports she is already on magnesium  nightly. Recommended consideration of botox or CGRP inhibitors to see if symptoms improve.           Puja Cleveland M.D.    13 total minutes were spent reviewing imaging, records, examining the patient and documenting.

## 2025-04-17 NOTE — ASSESSMENT & PLAN NOTE
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 Works as a nurse.     Exam 4/3/25: XT 12 prism diopters with 12 cm NPC. Prefers 1 base in at near    Exam 4/17/25: XT 4 prism diopters with 15 cm NPC. 8 SETH fusional vergence    Plan:  -tried 1 base in trial without significant improvement in symptoms. Given strain/fatigue with screens recommended trial of FL41 lenses and artificial tears . Will defer application of prism at this time. If patient notices worsening symptoms to retrun.

## 2025-04-18 ENCOUNTER — OFFICE VISIT (OUTPATIENT)
Dept: MEDICAL GROUP | Facility: IMAGING CENTER | Age: 25
End: 2025-04-18
Payer: COMMERCIAL

## 2025-04-18 VITALS
OXYGEN SATURATION: 98 % | WEIGHT: 151.4 LBS | DIASTOLIC BLOOD PRESSURE: 62 MMHG | TEMPERATURE: 98.3 F | SYSTOLIC BLOOD PRESSURE: 106 MMHG | HEART RATE: 78 BPM | BODY MASS INDEX: 25.85 KG/M2 | HEIGHT: 64 IN

## 2025-04-18 DIAGNOSIS — G93.89 MASS OF PINEAL REGION: ICD-10-CM

## 2025-04-18 DIAGNOSIS — G43.709 CHRONIC MIGRAINE WITHOUT AURA WITHOUT STATUS MIGRAINOSUS, NOT INTRACTABLE: ICD-10-CM

## 2025-04-18 PROCEDURE — 3074F SYST BP LT 130 MM HG: CPT | Performed by: STUDENT IN AN ORGANIZED HEALTH CARE EDUCATION/TRAINING PROGRAM

## 2025-04-18 PROCEDURE — 3078F DIAST BP <80 MM HG: CPT | Performed by: STUDENT IN AN ORGANIZED HEALTH CARE EDUCATION/TRAINING PROGRAM

## 2025-04-18 PROCEDURE — 1126F AMNT PAIN NOTED NONE PRSNT: CPT | Performed by: STUDENT IN AN ORGANIZED HEALTH CARE EDUCATION/TRAINING PROGRAM

## 2025-04-18 PROCEDURE — 99214 OFFICE O/P EST MOD 30 MIN: CPT | Performed by: STUDENT IN AN ORGANIZED HEALTH CARE EDUCATION/TRAINING PROGRAM

## 2025-04-18 RX ORDER — PROPRANOLOL HYDROCHLORIDE 10 MG/1
10 TABLET ORAL 2 TIMES DAILY
Qty: 60 TABLET | Refills: 2 | Status: SHIPPED | OUTPATIENT
Start: 2025-04-18

## 2025-04-18 RX ORDER — SUMATRIPTAN SUCCINATE 25 MG/1
25 TABLET ORAL
Qty: 10 TABLET | Refills: 3 | Status: SHIPPED | OUTPATIENT
Start: 2025-04-18 | End: 2025-04-21 | Stop reason: SDUPTHER

## 2025-04-18 ASSESSMENT — PAIN SCALES - GENERAL: PAINLEVEL_OUTOF10: NO PAIN

## 2025-04-18 ASSESSMENT — FIBROSIS 4 INDEX: FIB4 SCORE: 0.32

## 2025-04-18 NOTE — PROGRESS NOTES
Subjective:     CC:   Chief Complaint   Patient presents with    Referral Needed     Referral needed for migraine specialist        HPI:     Verbal consent was acquired by the patient to use [employer] ambient listening note generation during this visit Yes      Sima Tracy Diallopj, 24 y.o., female,  presents today to discuss:     History of Present Illness    Migraines  She has been under the care of Dr. Wu, who diagnosed her with migraines and initially prescribed amitriptyline. However, the medication was discontinued due to profound fatigue. The patient reports experiencing daily migraines, which are accompanied by dizziness, nausea, and photophobia during severe headache episodes. The intensity of the headaches varies, with a baseline pain level of 2 to 3 out of 10, escalating to 8 out of 10 during severe episodes. Dietary habits and activities have been monitored to identify potential triggers, but the migraines appear to occur randomly. There is no history of asthma, and beta-adrenergic antagonists such as propranolol or metoprolol have not been previously trialed. Acetaminophen and ibuprofen have been avoided due to the risk of medication-overuse headaches. The patient has been using oral contraceptives for the past 6 years, and discontinuation is being considered to evaluate if it alleviates the headaches. Menstruation occurs every 3 months while on birth control. The patient maintains adequate hydration and takes vitamin B2 and magnesium supplements, although the latter has resulted in loose stools. Magnesium intake has been reduced to every other day, which has improved stool consistency but has not mitigated the headaches.    Pineal mass  A second opinion was sought from a neurologist at [facility] in [place] regarding a pineal mass. A lumbar puncture was suggested for cytological analysis to determine the nature of the mass.           ROS:  See HPI    Medications, allergies, past  "medical history, family history, surgical history, and social history documented in chart and reviewed by me.       Objective:   Exam:  /62 (BP Location: Left arm, Patient Position: Sitting, BP Cuff Size: Adult)   Pulse 78   Temp 36.8 °C (98.3 °F) (Temporal)   Ht 1.626 m (5' 4\")   Wt 68.7 kg (151 lb 6.4 oz)   LMP 02/19/2025 (Approximate)   SpO2 98%   BMI 25.99 kg/m²      General: In no acute distress. Normal appearance.   Head:   Atraumatic, normocephalic.   Neck: Supple without lymphadenopathy.   Pulmonary: Normal effort, clear to auscultation bilaterally, no wheezes, rhonchi, or rales  Cardiovascular:   Regular rate and rhythm. No murmurs, rubs, or gallops.  Musculoskeletal:  Normal ROM. No edema.    Skin: No visible rashes or lesions.  Neurological: Alert and oriented to person, place, and time. Gait normal.   Psychiatric: Normal mood and affect. Calm and friendly behavior. Speech clear. Normal judgement and insight.         Assessment & Plan:       Assessment & Plan    1. Chronic migraine without aura without status migrainosus, not intractable  Newly diagnosed, uncontrolled.   - Reports daily migraines accompanied by dizziness, nausea, and light sensitivity.  - Amitriptyline was previously prescribed but discontinued due to intolerable side effects.  -Propranolol 10 mg twice daily prescribed, with potential dosage increase to 20 mg after 2 weeks if no improvement. Sumatriptan 25 mg prescribed for acute migraine attacks, with instructions to start with half a tablet to assess tolerance.  - Advised to consider discontinuing birth control for a trial period of one month to determine if it is contributing to migraines, and to use condoms or Plan B during this time to prevent pregnancy. Adequate hydration emphasized. If occipital injections or Botox treatments are desired, she should inform the provider for a referral to pain management.  -Recommend scheduling appointment with neurology to discuss other " alternatives if migraines do not improve.  - propranolol (INDERAL) 10 MG Tab; Take 1 Tablet by mouth 2 times a day.  Dispense: 60 Tablet; Refill: 2  - SUMAtriptan (IMITREX) 25 MG Tab tablet; Take 1 Tablet by mouth one time as needed for Migraine for up to 1 dose.  Dispense: 10 Tablet; Refill: 3    2. Mass of pineal region  Newly diagnosed.  Stable.  - Consulted with a neurologist at [facility] in [place] regarding the pineal mass.  - A lumbar puncture was suggested for cytological analysis to determine the nature of the mass.  - Advised to mention this to the neurologist to determine if it is necessary.         Diagnosis and treatment plan explained to pt. Counseled pt on new medication(s) and potential side effects. Pt agreed with treatment plan and verbalized understanding.     Return if symptoms worsen or fail to improve.     Please note that this dictation was created using voice recognition software. I have made every reasonable attempt to correct obvious errors, but I expect that there are errors of grammar and possibly content that I did not discover before finalizing the note.    Tamie Joyner PA-C  [facility]

## 2025-04-21 ENCOUNTER — TELEPHONE (OUTPATIENT)
Dept: HEMATOLOGY ONCOLOGY | Facility: MEDICAL CENTER | Age: 25
End: 2025-04-21
Payer: COMMERCIAL

## 2025-04-21 DIAGNOSIS — G93.89 MASS OF PINEAL REGION: ICD-10-CM

## 2025-04-21 DIAGNOSIS — G43.709 CHRONIC MIGRAINE WITHOUT AURA WITHOUT STATUS MIGRAINOSUS, NOT INTRACTABLE: ICD-10-CM

## 2025-04-21 RX ORDER — SUMATRIPTAN SUCCINATE 25 MG/1
25 TABLET ORAL
Qty: 30 TABLET | Refills: 1 | Status: SHIPPED | OUTPATIENT
Start: 2025-04-21 | End: 2025-04-23 | Stop reason: SDUPTHER

## 2025-04-21 NOTE — PROGRESS NOTES
I discussed with the patient the treatment plan and further diagnostic work up.  She has a tectal mass and would like to pursue CSF analysis to rule out a germinal tumor.   Serum AFP and b-HCG already resulted normal.     Judd Wu M.D.

## 2025-04-21 NOTE — TELEPHONE ENCOUNTER
Received request via: Pharmacy    Was the patient seen in the last year in this department? Yes    Does the patient have an active prescription (recently filled or refills available) for medication(s) requested? Yes. Pharmacy LVM to confirm dosage on script. They need a max dose per day or Max dose per 24 hours in order to fill this prescription.     Pharmacy Name: [facility] [address]     Does the patient have custodial Plus and need 100-day supply? (This applies to ALL medications) Patient does not have SCP

## 2025-04-23 ENCOUNTER — PATIENT MESSAGE (OUTPATIENT)
Dept: MEDICAL GROUP | Facility: IMAGING CENTER | Age: 25
End: 2025-04-23
Payer: COMMERCIAL

## 2025-04-23 DIAGNOSIS — G43.709 CHRONIC MIGRAINE WITHOUT AURA WITHOUT STATUS MIGRAINOSUS, NOT INTRACTABLE: ICD-10-CM

## 2025-04-23 RX ORDER — SUMATRIPTAN SUCCINATE 25 MG/1
25 TABLET ORAL
Qty: 30 TABLET | Refills: 1 | Status: SHIPPED | OUTPATIENT
Start: 2025-04-23 | End: 2025-04-25 | Stop reason: SDUPTHER

## 2025-04-25 ENCOUNTER — TELEPHONE (OUTPATIENT)
Dept: MEDICAL GROUP | Facility: IMAGING CENTER | Age: 25
End: 2025-04-25
Payer: COMMERCIAL

## 2025-04-25 ENCOUNTER — HOSPITAL ENCOUNTER (OUTPATIENT)
Dept: LAB | Facility: MEDICAL CENTER | Age: 25
End: 2025-04-25
Attending: PSYCHIATRY & NEUROLOGY
Payer: COMMERCIAL

## 2025-04-25 DIAGNOSIS — G43.709 CHRONIC MIGRAINE WITHOUT AURA WITHOUT STATUS MIGRAINOSUS, NOT INTRACTABLE: ICD-10-CM

## 2025-04-25 DIAGNOSIS — G93.89 MASS OF PINEAL REGION: ICD-10-CM

## 2025-04-25 LAB
APTT PPP: 29.7 SEC (ref 24.7–36)
BASOPHILS # BLD AUTO: 1.1 % (ref 0–1.8)
BASOPHILS # BLD: 0.04 K/UL (ref 0–0.12)
EOSINOPHIL # BLD AUTO: 0.1 K/UL (ref 0–0.51)
EOSINOPHIL NFR BLD: 2.8 % (ref 0–6.9)
ERYTHROCYTE [DISTWIDTH] IN BLOOD BY AUTOMATED COUNT: 40.1 FL (ref 35.9–50)
HCT VFR BLD AUTO: 44.5 % (ref 37–47)
HGB BLD-MCNC: 14.9 G/DL (ref 12–16)
IMM GRANULOCYTES # BLD AUTO: 0 K/UL (ref 0–0.11)
IMM GRANULOCYTES NFR BLD AUTO: 0 % (ref 0–0.9)
INR PPP: 1.03 (ref 0.87–1.13)
LYMPHOCYTES # BLD AUTO: 1.47 K/UL (ref 1–4.8)
LYMPHOCYTES NFR BLD: 40.9 % (ref 22–41)
MCH RBC QN AUTO: 31.6 PG (ref 27–33)
MCHC RBC AUTO-ENTMCNC: 33.5 G/DL (ref 32.2–35.5)
MCV RBC AUTO: 94.3 FL (ref 81.4–97.8)
MONOCYTES # BLD AUTO: 0.29 K/UL (ref 0–0.85)
MONOCYTES NFR BLD AUTO: 8.1 % (ref 0–13.4)
NEUTROPHILS # BLD AUTO: 1.69 K/UL (ref 1.82–7.42)
NEUTROPHILS NFR BLD: 47.1 % (ref 44–72)
NRBC # BLD AUTO: 0 K/UL
NRBC BLD-RTO: 0 /100 WBC (ref 0–0.2)
PLATELET # BLD AUTO: 322 K/UL (ref 164–446)
PMV BLD AUTO: 10.4 FL (ref 9–12.9)
PROTHROMBIN TIME: 13.6 SEC (ref 12–14.6)
RBC # BLD AUTO: 4.72 M/UL (ref 4.2–5.4)
WBC # BLD AUTO: 3.6 K/UL (ref 4.8–10.8)

## 2025-04-25 PROCEDURE — 85730 THROMBOPLASTIN TIME PARTIAL: CPT

## 2025-04-25 PROCEDURE — 85025 COMPLETE CBC W/AUTO DIFF WBC: CPT

## 2025-04-25 PROCEDURE — 85610 PROTHROMBIN TIME: CPT

## 2025-04-25 PROCEDURE — 36415 COLL VENOUS BLD VENIPUNCTURE: CPT

## 2025-04-25 RX ORDER — SUMATRIPTAN SUCCINATE 25 MG/1
25 TABLET ORAL
Qty: 30 TABLET | Refills: 1 | Status: SHIPPED | OUTPATIENT
Start: 2025-04-25

## 2025-04-25 NOTE — TELEPHONE ENCOUNTER
Pharmacy request for maximum dose for Sumatriptan use in the 24 hours. Pharmacy Request for new scription.

## 2025-05-01 DIAGNOSIS — G93.89 MASS OF PINEAL REGION: ICD-10-CM

## 2025-05-05 DIAGNOSIS — G93.89 MASS OF PINEAL REGION: ICD-10-CM

## 2025-05-17 ENCOUNTER — HOSPITAL ENCOUNTER (OUTPATIENT)
Dept: LAB | Facility: MEDICAL CENTER | Age: 25
End: 2025-05-17
Attending: PHYSICIAN ASSISTANT
Payer: COMMERCIAL

## 2025-05-17 DIAGNOSIS — G93.89 MASS OF PINEAL REGION: ICD-10-CM

## 2025-05-17 LAB
APTT PPP: 28.2 SEC (ref 24.7–36)
BASOPHILS # BLD AUTO: 0.9 % (ref 0–1.8)
BASOPHILS # BLD: 0.04 K/UL (ref 0–0.12)
EOSINOPHIL # BLD AUTO: 0.09 K/UL (ref 0–0.51)
EOSINOPHIL NFR BLD: 2 % (ref 0–6.9)
ERYTHROCYTE [DISTWIDTH] IN BLOOD BY AUTOMATED COUNT: 39.5 FL (ref 35.9–50)
HCT VFR BLD AUTO: 41.8 % (ref 37–47)
HGB BLD-MCNC: 14.2 G/DL (ref 12–16)
IMM GRANULOCYTES # BLD AUTO: 0.01 K/UL (ref 0–0.11)
IMM GRANULOCYTES NFR BLD AUTO: 0.2 % (ref 0–0.9)
INR PPP: 1.06 (ref 0.87–1.13)
LYMPHOCYTES # BLD AUTO: 1.44 K/UL (ref 1–4.8)
LYMPHOCYTES NFR BLD: 32.4 % (ref 22–41)
MCH RBC QN AUTO: 32 PG (ref 27–33)
MCHC RBC AUTO-ENTMCNC: 34 G/DL (ref 32.2–35.5)
MCV RBC AUTO: 94.1 FL (ref 81.4–97.8)
MONOCYTES # BLD AUTO: 0.4 K/UL (ref 0–0.85)
MONOCYTES NFR BLD AUTO: 9 % (ref 0–13.4)
NEUTROPHILS # BLD AUTO: 2.46 K/UL (ref 1.82–7.42)
NEUTROPHILS NFR BLD: 55.5 % (ref 44–72)
NRBC # BLD AUTO: 0 K/UL
NRBC BLD-RTO: 0 /100 WBC (ref 0–0.2)
PLATELET # BLD AUTO: 269 K/UL (ref 164–446)
PMV BLD AUTO: 10.3 FL (ref 9–12.9)
PROTHROMBIN TIME: 13.8 SEC (ref 12–14.6)
RBC # BLD AUTO: 4.44 M/UL (ref 4.2–5.4)
WBC # BLD AUTO: 4.4 K/UL (ref 4.8–10.8)

## 2025-05-17 PROCEDURE — 85025 COMPLETE CBC W/AUTO DIFF WBC: CPT

## 2025-05-17 PROCEDURE — 85610 PROTHROMBIN TIME: CPT

## 2025-05-17 PROCEDURE — 85730 THROMBOPLASTIN TIME PARTIAL: CPT

## 2025-05-17 PROCEDURE — 36415 COLL VENOUS BLD VENIPUNCTURE: CPT

## 2025-05-22 ENCOUNTER — HOSPITAL ENCOUNTER (OUTPATIENT)
Facility: MEDICAL CENTER | Age: 25
End: 2025-05-22
Attending: PSYCHIATRY & NEUROLOGY
Payer: COMMERCIAL

## 2025-05-22 ENCOUNTER — HOSPITAL ENCOUNTER (OUTPATIENT)
Dept: RADIOLOGY | Facility: MEDICAL CENTER | Age: 25
End: 2025-05-22
Attending: PHYSICIAN ASSISTANT
Payer: COMMERCIAL

## 2025-05-22 DIAGNOSIS — G93.89 MASS OF PINEAL REGION: ICD-10-CM

## 2025-05-22 LAB
CYTOLOGY REG CYTOL: NORMAL
GLUCOSE CSF-MCNC: 59 MG/DL (ref 40–80)
PROT CSF-MCNC: 26 MG/DL (ref 15–45)

## 2025-05-22 PROCEDURE — 84702 CHORIONIC GONADOTROPIN TEST: CPT

## 2025-05-22 PROCEDURE — 82945 GLUCOSE OTHER FLUID: CPT

## 2025-05-22 PROCEDURE — 88108 CYTOPATH CONCENTRATE TECH: CPT | Mod: 26 | Performed by: PATHOLOGY

## 2025-05-22 PROCEDURE — 86316 IMMUNOASSAY TUMOR OTHER: CPT

## 2025-05-22 PROCEDURE — 88108 CYTOPATH CONCENTRATE TECH: CPT | Performed by: PATHOLOGY

## 2025-05-22 PROCEDURE — 84157 ASSAY OF PROTEIN OTHER: CPT

## 2025-05-22 PROCEDURE — 62328 DX LMBR SPI PNXR W/FLUOR/CT: CPT

## 2025-05-24 LAB
AFP CSF-MCNC: <1 NG/ML (ref 0–1)
TEST NAME 95000: NORMAL

## 2025-07-03 ENCOUNTER — TELEPHONE (OUTPATIENT)
Dept: NEUROLOGY | Facility: MEDICAL CENTER | Age: 25
End: 2025-07-03

## 2025-07-03 ENCOUNTER — HOSPITAL ENCOUNTER (OUTPATIENT)
Dept: HEMATOLOGY ONCOLOGY | Facility: MEDICAL CENTER | Age: 25
End: 2025-07-03
Attending: PSYCHIATRY & NEUROLOGY
Payer: COMMERCIAL

## 2025-07-03 VITALS — WEIGHT: 150 LBS | BODY MASS INDEX: 25.75 KG/M2

## 2025-07-03 DIAGNOSIS — G43.709 CHRONIC MIGRAINE WITHOUT AURA WITHOUT STATUS MIGRAINOSUS, NOT INTRACTABLE: ICD-10-CM

## 2025-07-03 PROCEDURE — 99215 OFFICE O/P EST HI 40 MIN: CPT | Mod: 95 | Performed by: PSYCHIATRY & NEUROLOGY

## 2025-07-03 RX ORDER — ATOGEPANT 30 MG/1
30 TABLET ORAL DAILY
Qty: 30 TABLET | Refills: 2 | Status: SHIPPED | OUTPATIENT
Start: 2025-07-03

## 2025-07-03 RX ORDER — SUMATRIPTAN SUCCINATE 25 MG/1
25 TABLET ORAL
Qty: 30 TABLET | Refills: 1 | Status: SHIPPED | OUTPATIENT
Start: 2025-07-03

## 2025-07-03 ASSESSMENT — FIBROSIS 4 INDEX: FIB4 SCORE: 0.33

## 2025-07-03 NOTE — PROGRESS NOTES
[facility]  NEUROLOGY TELEMEDICINE ENCOUNTER    Date: 7/3/25    Chief complain: Pineal region mass, headache.  Location: [address] [place] [address]      History of present illness (HPI): 3/6/25  This is a 24-year-old woman with no significant past medical history who underwent a brain MRI due to persistent daily headaches and dizziness over the past two months. 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    She reports daily, pressure-like headaches that are sometimes accompanied by nausea and occasional vomiting. The headaches tend to worsen with movement or straining, such as when pressing the brakes while driving or bending to tie her shoelaces. She frequently wakes up in the middle of the night with head pain, and sometimes the discomfort radiates to the ears and occipital region. She has been taking acetaminophen, ibuprofen, and ondansetron regularly, with minimal relief.    Additionally, she describes new visual difficulties, including trouble focusing and the need to zoom in on her phone to read. She denies diplopia but notes persistent blurriness. Over the same period, she has experienced a marked decrease in libido and reports unusually heavy menstrual bleeding, despite no recent changes to her contraceptive regimen, which she has used consistently for the past seven years.      Follow up: 4/3/25  Sima was seen via virtual encounter for follow-up of a pineal region mass and associated headaches. She joined the visit from a vehicle with her mother present. She recently completed serum tumor marker testing for germ cell tumors, which returned within normal limits.    She reports a history of headaches for approximately one year, which have become constant over the past six months, occurring daily. The pain is described as mild (2-3/10 in intensity), generally stable throughout the day, and located predominantly in the frontal region with radiation to the occiput, affecting both  sides. She occasionally experiences blurry vision but denies photophobia; loud noises can be bothersome at times. On rare occasions, the headache becomes more severe and is associated with nausea, lasting from one to several hours.    She is no longer consuming caffeine and is making an effort to stay well-hydrated. Her current regimen includes regular use of acetaminophen and occasional use of ibuprofen, with a preference for acetaminophen. Compazine has been effective for managing episodes of nausea.    Follow up: 7/3/25  Sima was seen today via virtual visit for follow-up of migraine headaches and a pineal mass. She reported that her headaches remained unchanged. The headache is described as a constant, dull discomfort located in the front of the head, sometimes bilateral and other times alternating sides. It worsens episodically two to three times per week, during which she experiences associated nausea but no photophobia, aura, or autonomic features such as facial flushing or lacrimation. She denied any focal neurological symptoms, including weakness or numbness. Headache intensity is generally 2-3 out of 10 at baseline, increasing during flares.     She has tried multiple treatments including propranolol (discontinued due to dizziness), amitriptyline (which she tolerated poorly), as well as acetaminophen, ibuprofen, caffeine, magnesium, and riboflavin, none of which have provided meaningful relief. She was prescribed sumatriptan (Imitrex) but was unable to pick it up from the pharmacy. Compazine has been effective in managing her nausea. Her next brain MRI is scheduled for October 9.        Medications:     Current Outpatient Medications:     SUMAtriptan, 25 mg, Oral, QDAY PRN    propranolol, 10 mg, Oral, BID    prochlorperazine, 10 mg, Oral, Q6HRS PRN    Daysee, 1 Tablet, Oral, DAILY      Exam: Limited exam   In no distress. Normal breathing.  Alert and oriented to name, time, place, and situation.   Speech  fluency and comprehension are intact.  There are no cranial neuropathies appreciated.     Laboratory results:   Lab Results   Component Value Date/Time    SODIUM 140 02/22/2025 10:24 AM    POTASSIUM 4.4 02/22/2025 10:24 AM    CHLORIDE 104 02/22/2025 10:24 AM    CO2 25 02/22/2025 10:24 AM    GLUCOSE 90 02/22/2025 10:24 AM    BUN 13 02/22/2025 10:24 AM    CREATININE 0.74 02/22/2025 10:24 AM        Lab Results   Component Value Date/Time    WBC 4.4 (L) 05/17/2025 08:52 AM    RBC 4.81 05/23/2025 03:31 PM    RBC 4.44 05/17/2025 08:52 AM    HEMOGLOBIN 15.5 05/23/2025 03:31 PM    HEMOGLOBIN 14.2 05/17/2025 08:52 AM    HEMATOCRIT 44 05/23/2025 03:31 PM    HEMATOCRIT 41.8 05/17/2025 08:52 AM    MCV 91.6 05/23/2025 03:31 PM    MCV 94.1 05/17/2025 08:52 AM    MCH 32.3 05/23/2025 03:31 PM    MCH 32.0 05/17/2025 08:52 AM    MCHC 35.3 05/23/2025 03:31 PM    MCHC 34.0 05/17/2025 08:52 AM    MPV 7.7 05/23/2025 03:31 PM    MPV 10.3 05/17/2025 08:52 AM    NEUTSPOLYS 75.2 05/23/2025 03:31 PM    NEUTSPOLYS 55.50 05/17/2025 08:52 AM    LYMPHOCYTES 18.1 05/23/2025 03:31 PM    LYMPHOCYTES 32.40 05/17/2025 08:52 AM    MONOCYTES 5.8 05/23/2025 03:31 PM    MONOCYTES 9.00 05/17/2025 08:52 AM    EOSINOPHILS 0.2 05/23/2025 03:31 PM    EOSINOPHILS 2.00 05/17/2025 08:52 AM    BASOPHILS 0.7 05/23/2025 03:31 PM    BASOPHILS 0.90 05/17/2025 08:52 AM          Component  Ref Range & Units (hover) 2 wk ago   Alpha Fetoprotein 1        Component  Ref Range & Units (hover) 2 wk ago   Bhcg <1.0       Imaging studies:   MRI brain: 2/28/25  1.  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 Differential considerations include pineal cyst, pineocytoma, pineal parenchymal tumor, and others. Postcontrast MR sequences are recommended for further evaluation.  2.  No evidence of acute territorial infarct or intracranial hemorrhage.          Assessment and Plan:  This is a 24-year-old woman with a pineal region mass and a longstanding history of daily headaches over the past 6 to  12 months. Serum tumor markers were within normal limits, effectively ruling out a germ cell tumor. Given the imaging characteristics and clinical course, the lesion is most consistent with a benign entity such as a pineocytoma or pineal cyst. Given the absence of high-risk features, I recommended continued surveillance with a repeat brain MRI in six months; her next scan is scheduled for mid-October, and I plan to re-evaluate her at that time.    Regarding her headaches, the clinical picture is most consistent with migraine without aura, with near-daily baseline symptoms punctuated by episodic flares accompanied by nausea. She has trialed propranolol and amitriptyline without benefit or tolerance and has not found relief with over-the-counter analgesics, caffeine, or supplements alone. We will initiate preventive therapy with atogepant (Qulipta) at 30 mg daily. For acute management, she will obtain sumatriptan (Imitrex) as an abortive agent. She may continue magnesium oxide 400 mg and riboflavin 400 mg daily as adjunctive therapy. If there is no improvement after an adequate trial of Qulipta, we will consider alternative preventive strategies at her follow-up.    Numerous questions were answered. The patient agrees with the plan and will return to office in 3 months.     ADMINISTRATIVE BILLING  I personally spent a total of 40 minutes for this encounter.      Judd Wu MD  Diplomate, American Board of Psychiatry and Neurology.  Neurologist & Neuro-Oncologist.  [facility], Department of Neurology and Oncology.   of Clinical Neurology at [facility].

## 2025-07-03 NOTE — TELEPHONE ENCOUNTER
Received [employer] request via [place]  for QULIPTA 60MG. (Quantity:30, Day Supply:30)     Insurance: [facility]  Member ID:  [id number]  BIN: [id number]  PCN: [id number]  Group: [id number]     Ran Test claim via [place] & medication Rejects stating prior authorization is required.

## 2025-07-03 NOTE — TELEPHONE ENCOUNTER
Prior Authorization for QULIPTA 60MG has been APPROVED for a quantity of 30 , day supply 30    Prior Authorization reference number:  [id number]    Insurance: [place]    Effective dates: 7/3/2025-10/1/2025    Copay: $45

## 2025-07-03 NOTE — TELEPHONE ENCOUNTER
Prior Authorization for QULIPTA 60MG (Quantity: 30, Days: 30) has been submitted via [id number]    Insurance: [facility]  Member ID:  [id number]  BIN: [id number]  PCN: [id number]  Group: [id number]     Will follow up in 24-48 business hours.

## 2025-07-03 NOTE — TELEPHONE ENCOUNTER
PT pharmacy called to get a prior auth for the PT medication they just have some questions about the med. The pharmacy would like a call back to go over it. Call at  [phone]  7/3/25   2:26 PM